# Patient Record
Sex: MALE | Race: WHITE | NOT HISPANIC OR LATINO | URBAN - METROPOLITAN AREA
[De-identification: names, ages, dates, MRNs, and addresses within clinical notes are randomized per-mention and may not be internally consistent; named-entity substitution may affect disease eponyms.]

---

## 2018-08-24 ENCOUNTER — TRANSCRIBE ORDERS (OUTPATIENT)
Dept: LAB | Age: 33
End: 2018-08-24

## 2018-08-24 ENCOUNTER — APPOINTMENT (OUTPATIENT)
Dept: LAB | Age: 33
End: 2018-08-24
Attending: INTERNAL MEDICINE
Payer: COMMERCIAL

## 2018-08-24 DIAGNOSIS — E10.9 TYPE 1 DIABETES MELLITUS WITHOUT COMPLICATIONS (CMS/HCC): ICD-10-CM

## 2018-08-24 DIAGNOSIS — E10.9 TYPE 1 DIABETES MELLITUS WITHOUT COMPLICATIONS (CMS/HCC): Primary | ICD-10-CM

## 2018-08-24 LAB
ALBUMIN SERPL-MCNC: 4 G/DL (ref 3.4–5)
ALBUMIN/CREAT UR: 3.6 UG/MG
ALP SERPL-CCNC: 81 IU/L (ref 35–126)
ALT SERPL-CCNC: 17 IU/L (ref 16–63)
ANION GAP SERPL CALC-SCNC: 11 MEQ/L (ref 3–15)
AST SERPL-CCNC: 14 IU/L (ref 15–41)
BILIRUB SERPL-MCNC: 1 MG/DL (ref 0.3–1.2)
BUN SERPL-MCNC: 17 MG/DL (ref 8–20)
CALCIUM SERPL-MCNC: 9.2 MG/DL (ref 8.9–10.3)
CHLORIDE SERPL-SCNC: 102 MMOL/L (ref 98–109)
CHOLEST SERPL-MCNC: 196 MG/DL
CO2 SERPL-SCNC: 23 MMOL/L (ref 22–32)
CREAT SERPL-MCNC: 0.9 MG/DL (ref 0.8–1.3)
CREAT UR-MCNC: 87.3 MG/DL
ERYTHROCYTE [DISTWIDTH] IN BLOOD BY AUTOMATED COUNT: 12.2 % (ref 11.6–14.4)
EST. AVERAGE GLUCOSE BLD GHB EST-MCNC: 200 MG/DL
GFR SERPL CREATININE-BSD FRML MDRD: >60 ML/MIN/1.73M*2
GLUCOSE SERPL-MCNC: 318 MG/DL (ref 70–99)
HBA1C MFR BLD HPLC: 8.6 %
HCT VFR BLDCO AUTO: 44.1 % (ref 40.1–51)
HDLC SERPL-MCNC: 78 MG/DL
HDLC SERPL: 2.5 {RATIO}
HGB BLD-MCNC: 14.7 G/DL (ref 13.7–17.5)
LDLC SERPL CALC-MCNC: 110 MG/DL
MCH RBC QN AUTO: 28.9 PG (ref 28–33.2)
MCHC RBC AUTO-ENTMCNC: 33.3 G/DL (ref 32.2–36.5)
MCV RBC AUTO: 86.8 FL (ref 83–98)
MICROALBUMIN UR-MCNC: 3.1 MG/L
NONHDLC SERPL-MCNC: 118 MG/DL
PDW BLD AUTO: 9.3 FL (ref 9.4–12.4)
PLATELET # BLD AUTO: 347 K/UL (ref 150–350)
POTASSIUM SERPL-SCNC: 4.2 MMOL/L (ref 3.6–5.1)
PROT SERPL-MCNC: 6.3 G/DL (ref 6–8.2)
RBC # BLD AUTO: 5.08 M/UL (ref 4.5–5.8)
SODIUM SERPL-SCNC: 136 MMOL/L (ref 136–144)
TRIGL SERPL-MCNC: 41 MG/DL (ref 30–149)
TSH SERPL DL<=0.05 MIU/L-ACNC: 0.86 MIU/ML (ref 0.34–5.6)
WBC # BLD AUTO: 7.36 K/UL (ref 3.8–10.5)

## 2018-08-24 PROCEDURE — 82043 UR ALBUMIN QUANTITATIVE: CPT

## 2018-08-24 PROCEDURE — 80053 COMPREHEN METABOLIC PANEL: CPT

## 2018-08-24 PROCEDURE — 36415 COLL VENOUS BLD VENIPUNCTURE: CPT

## 2018-08-24 PROCEDURE — 83036 HEMOGLOBIN GLYCOSYLATED A1C: CPT

## 2018-08-24 PROCEDURE — 84443 ASSAY THYROID STIM HORMONE: CPT

## 2018-08-24 PROCEDURE — 85027 COMPLETE CBC AUTOMATED: CPT

## 2018-08-24 PROCEDURE — 80061 LIPID PANEL: CPT

## 2018-11-30 ENCOUNTER — APPOINTMENT (OUTPATIENT)
Dept: LAB | Age: 33
End: 2018-11-30
Attending: INTERNAL MEDICINE
Payer: COMMERCIAL

## 2018-11-30 ENCOUNTER — TRANSCRIBE ORDERS (OUTPATIENT)
Dept: LAB | Age: 33
End: 2018-11-30

## 2018-11-30 DIAGNOSIS — E11.649 TYPE 2 DIABETES MELLITUS WITH HYPOGLYCEMIA WITHOUT COMA (CMS/HCC): ICD-10-CM

## 2018-11-30 DIAGNOSIS — E10.9 TYPE 1 DIABETES MELLITUS WITHOUT COMPLICATIONS (CMS/HCC): Primary | ICD-10-CM

## 2018-11-30 DIAGNOSIS — E10.9 TYPE 1 DIABETES MELLITUS WITHOUT COMPLICATIONS (CMS/HCC): ICD-10-CM

## 2018-11-30 LAB
ANION GAP SERPL CALC-SCNC: 11 MEQ/L (ref 3–15)
BUN SERPL-MCNC: 14 MG/DL (ref 8–20)
CALCIUM SERPL-MCNC: 9 MG/DL (ref 8.9–10.3)
CHLORIDE SERPL-SCNC: 100 MEQ/L (ref 98–109)
CHOLEST SERPL-MCNC: 189 MG/DL
CO2 SERPL-SCNC: 24 MEQ/L (ref 22–32)
CREAT SERPL-MCNC: 1 MG/DL
GFR SERPL CREATININE-BSD FRML MDRD: >60 ML/MIN/1.73M*2
GLUCOSE SERPL-MCNC: 246 MG/DL (ref 70–99)
HDLC SERPL-MCNC: 66 MG/DL
HDLC SERPL: 2.9 {RATIO}
LDLC SERPL CALC-MCNC: 112 MG/DL
NONHDLC SERPL-MCNC: 123 MG/DL
POTASSIUM SERPL-SCNC: 4.4 MEQ/L (ref 3.6–5.1)
SODIUM SERPL-SCNC: 135 MEQ/L (ref 136–144)
TRIGL SERPL-MCNC: 53 MG/DL (ref 30–149)

## 2018-11-30 PROCEDURE — 36415 COLL VENOUS BLD VENIPUNCTURE: CPT

## 2018-11-30 PROCEDURE — 80061 LIPID PANEL: CPT

## 2018-11-30 PROCEDURE — 83036 HEMOGLOBIN GLYCOSYLATED A1C: CPT

## 2018-11-30 PROCEDURE — 80048 BASIC METABOLIC PNL TOTAL CA: CPT

## 2018-12-01 LAB
EST. AVERAGE GLUCOSE BLD GHB EST-MCNC: 203 MG/DL
HBA1C MFR BLD HPLC: 8.7 %

## 2019-03-02 ENCOUNTER — TRANSCRIBE ORDERS (OUTPATIENT)
Dept: LAB | Age: 34
End: 2019-03-02

## 2019-03-02 ENCOUNTER — APPOINTMENT (OUTPATIENT)
Dept: LAB | Age: 34
End: 2019-03-02
Attending: INTERNAL MEDICINE
Payer: COMMERCIAL

## 2019-03-02 DIAGNOSIS — E10.9 TYPE 1 DIABETES MELLITUS WITHOUT COMPLICATIONS (CMS/HCC): Primary | ICD-10-CM

## 2019-03-02 DIAGNOSIS — E78.5 HYPERLIPIDEMIA: ICD-10-CM

## 2019-03-02 DIAGNOSIS — E10.9 TYPE 1 DIABETES MELLITUS WITHOUT COMPLICATIONS (CMS/HCC): ICD-10-CM

## 2019-03-02 DIAGNOSIS — E10.649 TYPE 1 DIABETES MELLITUS WITH HYPOGLYCEMIA AND WITHOUT COMA (CMS/HCC): ICD-10-CM

## 2019-03-02 LAB
ANION GAP SERPL CALC-SCNC: 8 MEQ/L (ref 3–15)
BUN SERPL-MCNC: 14 MG/DL (ref 8–20)
CALCIUM SERPL-MCNC: 9.2 MG/DL (ref 8.9–10.3)
CHLORIDE SERPL-SCNC: 105 MEQ/L (ref 98–109)
CHOLEST SERPL-MCNC: 134 MG/DL
CO2 SERPL-SCNC: 25 MEQ/L (ref 22–32)
CREAT SERPL-MCNC: 0.8 MG/DL
EST. AVERAGE GLUCOSE BLD GHB EST-MCNC: 200 MG/DL
GFR SERPL CREATININE-BSD FRML MDRD: >60 ML/MIN/1.73M*2
GLUCOSE SERPL-MCNC: 87 MG/DL (ref 70–99)
HBA1C MFR BLD HPLC: 8.6 %
HDLC SERPL-MCNC: 58 MG/DL
HDLC SERPL: 2.3 {RATIO}
LDLC SERPL CALC-MCNC: 67 MG/DL
NONHDLC SERPL-MCNC: 76 MG/DL
POTASSIUM SERPL-SCNC: 4.1 MEQ/L (ref 3.6–5.1)
SODIUM SERPL-SCNC: 138 MEQ/L (ref 136–144)
T4 FREE SERPL-MCNC: 1.04 NG/DL (ref 0.58–1.64)
TRIGL SERPL-MCNC: 45 MG/DL (ref 30–149)
TSH SERPL DL<=0.05 MIU/L-ACNC: 0.99 MIU/L (ref 0.34–5.6)

## 2019-03-02 PROCEDURE — 36415 COLL VENOUS BLD VENIPUNCTURE: CPT

## 2019-03-02 PROCEDURE — 84443 ASSAY THYROID STIM HORMONE: CPT

## 2019-03-02 PROCEDURE — 83036 HEMOGLOBIN GLYCOSYLATED A1C: CPT

## 2019-03-02 PROCEDURE — 80061 LIPID PANEL: CPT

## 2019-03-02 PROCEDURE — 84439 ASSAY OF FREE THYROXINE: CPT

## 2019-03-02 PROCEDURE — 82565 ASSAY OF CREATININE: CPT

## 2019-06-08 ENCOUNTER — TRANSCRIBE ORDERS (OUTPATIENT)
Dept: LAB | Age: 34
End: 2019-06-08

## 2019-06-08 ENCOUNTER — APPOINTMENT (OUTPATIENT)
Dept: LAB | Age: 34
End: 2019-06-08
Attending: INTERNAL MEDICINE
Payer: COMMERCIAL

## 2019-06-08 DIAGNOSIS — E10.9 TYPE 1 DIABETES MELLITUS WITHOUT COMPLICATIONS (CMS/HCC): Primary | ICD-10-CM

## 2019-06-08 DIAGNOSIS — E10.9 TYPE 1 DIABETES MELLITUS WITHOUT COMPLICATIONS (CMS/HCC): ICD-10-CM

## 2019-06-08 LAB
ALBUMIN/CREAT UR: 2.5 UG/MG
ANION GAP SERPL CALC-SCNC: 8 MEQ/L (ref 3–15)
BUN SERPL-MCNC: 13 MG/DL (ref 8–20)
CALCIUM SERPL-MCNC: 9.1 MG/DL (ref 8.9–10.3)
CHLORIDE SERPL-SCNC: 104 MEQ/L (ref 98–109)
CO2 SERPL-SCNC: 25 MEQ/L (ref 22–32)
CREAT SERPL-MCNC: 0.8 MG/DL
CREAT UR-MCNC: 79.7 MG/DL
EST. AVERAGE GLUCOSE BLD GHB EST-MCNC: 200 MG/DL
GFR SERPL CREATININE-BSD FRML MDRD: >60 ML/MIN/1.73M*2
GLUCOSE SERPL-MCNC: 240 MG/DL (ref 70–99)
HBA1C MFR BLD HPLC: 8.6 %
MICROALBUMIN UR-MCNC: 2 MG/L
POTASSIUM SERPL-SCNC: 4.2 MEQ/L (ref 3.6–5.1)
SODIUM SERPL-SCNC: 137 MEQ/L (ref 136–144)

## 2019-06-08 PROCEDURE — 83036 HEMOGLOBIN GLYCOSYLATED A1C: CPT

## 2019-06-08 PROCEDURE — 36415 COLL VENOUS BLD VENIPUNCTURE: CPT

## 2019-06-08 PROCEDURE — 82043 UR ALBUMIN QUANTITATIVE: CPT

## 2019-06-08 PROCEDURE — 80048 BASIC METABOLIC PNL TOTAL CA: CPT

## 2019-06-25 ENCOUNTER — TRANSCRIBE ORDERS (OUTPATIENT)
Dept: LAB | Age: 34
End: 2019-06-25

## 2019-06-25 ENCOUNTER — APPOINTMENT (OUTPATIENT)
Dept: LAB | Age: 34
End: 2019-06-25
Attending: OBSTETRICS & GYNECOLOGY
Payer: COMMERCIAL

## 2019-06-25 DIAGNOSIS — Z13.228 ENCOUNTER FOR SCREENING FOR OTHER METABOLIC DISORDERS: Primary | ICD-10-CM

## 2019-06-25 DIAGNOSIS — Z13.228 ENCOUNTER FOR SCREENING FOR OTHER METABOLIC DISORDERS: ICD-10-CM

## 2019-06-25 PROCEDURE — 81220 CFTR GENE COM VARIANTS: CPT

## 2019-06-25 PROCEDURE — 36415 COLL VENOUS BLD VENIPUNCTURE: CPT

## 2019-07-04 LAB
CFTR MUT ANL BLD/T: NORMAL
CFTR MUT ANL BLD/T: NORMAL
CFTR MUT TESTED BLD/T: NORMAL
GENETICIST REVIEW: NORMAL
REF LAB TEST METHOD: NORMAL

## 2019-07-31 ENCOUNTER — OFFICE VISIT (OUTPATIENT)
Dept: FAMILY MEDICINE | Facility: CLINIC | Age: 34
End: 2019-07-31
Payer: COMMERCIAL

## 2019-07-31 VITALS
RESPIRATION RATE: 17 BRPM | TEMPERATURE: 98.2 F | HEART RATE: 81 BPM | DIASTOLIC BLOOD PRESSURE: 80 MMHG | WEIGHT: 186.8 LBS | OXYGEN SATURATION: 97 % | HEIGHT: 66 IN | BODY MASS INDEX: 30.02 KG/M2 | SYSTOLIC BLOOD PRESSURE: 112 MMHG

## 2019-07-31 DIAGNOSIS — Z00.00 ROUTINE MEDICAL EXAM: Primary | ICD-10-CM

## 2019-07-31 LAB
BILIRUBIN, POC: NEGATIVE
BLOOD URINE, POC: NEGATIVE
CLARITY, POC: CLEAR
COLOR, POC: YELLOW
GLUCOSE URINE, POC: NORMAL
KETONES, POC: NEGATIVE
LEUKOCYTE EST, POC: NEGATIVE
NITRITE, POC: NEGATIVE
PH, POC: 6
PROTEIN, POC: NEGATIVE
SPECIFIC GRAVITY, POC: 1.01
UROBILINOGEN, POC: 0.2

## 2019-07-31 PROCEDURE — 99385 PREV VISIT NEW AGE 18-39: CPT | Performed by: NURSE PRACTITIONER

## 2019-07-31 PROCEDURE — 81002 URINALYSIS NONAUTO W/O SCOPE: CPT | Performed by: NURSE PRACTITIONER

## 2019-07-31 RX ORDER — ATORVASTATIN CALCIUM 20 MG/1
20 TABLET, FILM COATED ORAL DAILY
COMMUNITY

## 2019-07-31 RX ORDER — INSULIN DEGLUDEC 100 U/ML
INJECTION, SOLUTION SUBCUTANEOUS
COMMUNITY

## 2019-07-31 ASSESSMENT — ENCOUNTER SYMPTOMS
PALPITATIONS: 0
CHEST TIGHTNESS: 0
RHINORRHEA: 0
SHORTNESS OF BREATH: 0
PSYCHIATRIC NEGATIVE: 1
SORE THROAT: 0
EYES NEGATIVE: 1
COUGH: 0
CARDIOVASCULAR NEGATIVE: 1
POLYPHAGIA: 0
CONFUSION: 0
MUSCULOSKELETAL NEGATIVE: 1
ENDOCRINE NEGATIVE: 1
FATIGUE: 0
FEVER: 0
FLANK PAIN: 0
DIZZINESS: 0
ARTHRALGIAS: 0
APPETITE CHANGE: 0
POLYDIPSIA: 0
NECK PAIN: 0
HEMATOLOGIC/LYMPHATIC NEGATIVE: 1
FREQUENCY: 0
DYSURIA: 0
GASTROINTESTINAL NEGATIVE: 1
DIARRHEA: 0
NEUROLOGICAL NEGATIVE: 1
RESPIRATORY NEGATIVE: 1
EYE DISCHARGE: 0
WHEEZING: 0
SEIZURES: 0
EYE REDNESS: 0
ALLERGIC/IMMUNOLOGIC NEGATIVE: 1
CONSTITUTIONAL NEGATIVE: 1
MYALGIAS: 0
ABDOMINAL PAIN: 0
NAUSEA: 0
HEADACHES: 0
CONSTIPATION: 0
ADENOPATHY: 0

## 2019-07-31 ASSESSMENT — VISUAL ACUITY
OS_CC: 20/40
OD_CC: 20/30

## 2019-07-31 NOTE — PROGRESS NOTES
[facility]     Reason for visit:   Chief Complaint   Patient presents with   • Annual Exam      HPI  Kervin Mitchell is a 34 y.o. male presents as a new patient for annual physical.    Health Maintenance    Most Recent Labs: 6/8/2019  Flu Vaccine: declined last year   Last Tetanus/Tdap: will get in the Fall when he gets flu vaccine   Last eye exam: 2 yr ago  Last dentist appt: due   Diet: follows diabetic diet   Exercise: goes to gym - and has 3 yr old at home  Sexual Activity: yes   Partners: no     Diabetic for 19 yrs taking insulin - follows with [facility]         Review of Systems   Constitutional: Negative.  Negative for appetite change, fatigue and fever.   HENT: Negative.  Negative for congestion, ear pain, rhinorrhea and sore throat.    Eyes: Negative.  Negative for discharge, redness and visual disturbance.   Respiratory: Negative.  Negative for cough, chest tightness, shortness of breath and wheezing.    Cardiovascular: Negative.  Negative for chest pain, palpitations and leg swelling.   Gastrointestinal: Negative.  Negative for abdominal pain, constipation, diarrhea and nausea.   Endocrine: Negative.  Negative for polydipsia, polyphagia and polyuria.   Genitourinary: Negative.  Negative for dysuria, flank pain, frequency and urgency.   Musculoskeletal: Negative.  Negative for arthralgias, myalgias and neck pain.   Skin: Negative.  Negative for pallor and rash.   Allergic/Immunologic: Negative.  Negative for immunocompromised state.   Neurological: Negative.  Negative for dizziness, seizures and headaches.   Hematological: Negative.  Negative for adenopathy.   Psychiatric/Behavioral: Negative.  Negative for behavioral problems and confusion.        There is no problem list on file for this patient.        Past Medical History:   Diagnosis Date   • Diabetes mellitus type I (CMS/HCC) (HCC)    • Hyperlipidemia      History reviewed. No pertinent surgical  "history.  Social History     Social History   • Marital status: Single     Spouse name: N/A   • Number of children: N/A   • Years of education: N/A     Occupational History   • Not on file.     Social History Main Topics   • Smoking status: Former Smoker   • Smokeless tobacco: Never Used   • Alcohol use 0.6 oz/week     1 Cans of beer per week   • Drug use: No   • Sexual activity: Not on file     Other Topics Concern   • Not on file     Social History Narrative   • No narrative on file     Family History   Problem Relation Age of Onset   • No Known Problems Mother    • Parkinsonism Father    • No Known Problems Sister        Allergies:  Naproxen    Current Outpatient Prescriptions   Medication Sig Dispense Refill   • atorvastatin (LIPITOR) 20 mg tablet Take 20 mg by mouth daily.     • insulin degludec (TRESIBA FLEXTOUCH U-100) 100 unit/mL (3 mL) insulin pen pen Inject under the skin.     • insulin lispro (HumaLOG U-100 Insulin) 100 unit/mL cartridge Inject under the skin.       No current facility-administered medications for this visit.         Objective   Vitals:    07/31/19 1415   BP: 112/80   BP Location: Left upper arm   Patient Position: Sitting   Pulse: 81   Resp: 17   Temp: 36.8 °C (98.2 °F)   SpO2: 97%   Weight: 84.7 kg (186 lb 12.8 oz)   Height: 1.664 m (5' 5.5\")     Ht Readings from Last 3 Encounters:   07/31/19 1.664 m (5' 5.5\")     Wt Readings from Last 3 Encounters:   07/31/19 84.7 kg (186 lb 12.8 oz)     Body mass index is 30.61 kg/m².     Visual Acuity Screening    Right eye Left eye Both eyes   Without correction:      With correction: 20/30 20/40 20/30   Comments: Not color blind      Physical Exam   Constitutional: He is oriented to person, place, and time. Vital signs are normal. He appears well-developed and well-nourished. He is cooperative.   HENT:   Head: Normocephalic.   Right Ear: Hearing, tympanic membrane, external ear and ear canal normal.   Left Ear: Hearing, tympanic membrane, external " ear and ear canal normal.   Eyes: Pupils are equal, round, and reactive to light. Conjunctivae, EOM and lids are normal.   Neck: Trachea normal and normal range of motion. Carotid bruit is not present. No thyroid mass and no thyromegaly present.   Cardiovascular: Normal rate, regular rhythm, S1 normal, S2 normal and normal heart sounds.    Pulses:       Radial pulses are 2+ on the right side, and 2+ on the left side.        Dorsalis pedis pulses are 2+ on the right side, and 2+ on the left side.   Pulmonary/Chest: Effort normal and breath sounds normal.   Abdominal: Soft. Normal appearance and bowel sounds are normal. There is no tenderness.   Musculoskeletal: Normal range of motion.   Lymphadenopathy:     He has no cervical adenopathy.   Neurological: He is alert and oriented to person, place, and time. He has normal strength. No cranial nerve deficit or sensory deficit. GCS eye subscore is 4. GCS verbal subscore is 5. GCS motor subscore is 6.   Skin: Skin is warm, dry and intact.   Psychiatric: He has a normal mood and affect. His speech is normal and behavior is normal.        Lab Results   Component Value Date    WBC 7.36 08/24/2018    HGB 14.7 08/24/2018    HCT 44.1 08/24/2018     08/24/2018     06/08/2019    K 4.2 06/08/2019     06/08/2019    BUN 13 06/08/2019    CREATININE 0.8 06/08/2019    CO2 25 06/08/2019    ALT 17 08/24/2018    AST 14 (L) 08/24/2018    CHOL 134 03/02/2019    TRIG 45 03/02/2019    HDL 58 03/02/2019    LDLCALC 67 03/02/2019    TSH 0.99 03/02/2019    HGBA1C 8.6 (H) 06/08/2019    MICROALBUR 2.0 06/08/2019       Point of Care Testing Results  Results for orders placed or performed in visit on 07/31/19   POCT urinalysis dipstick   Result Value Ref Range    Color, UA Yellow     Clarity, UA Clear     Glucose, UA 2+     Bilirubin, UA Negative     Ketones, UA Negative     Spec Grav, UA 1.015     Blood, UA Negative     pH, UA 6.0     Protein, UA Negative     Urobilinogen, UA 0.2      Leukocytes, UA Negative     Nitrite, UA Negative         Assessment   Diagnoses and all orders for this visit:    Routine medical exam (Primary)  -     POCT urinalysis dipstick    Pt encouraged to make appt to see eye doctor and dentist.  Pt will continue to follow up with Endocrinology. Encouraged to return in the Fall for Tdap and Flu vaccine.  The patient has been in otherwise good general health in the past.        Patient has been educated on the risks, benefits, and side effects of all medication prescribed at this visit.  Patient expressed understanding and agreement with plan.  Return in about 1 year (around 7/31/2020).  MANUEL Gross  7/31/2019       There are no Patient Instructions on file for this visit.

## 2019-09-29 ENCOUNTER — TRANSCRIBE ORDERS (OUTPATIENT)
Dept: LAB | Age: 34
End: 2019-09-29

## 2019-09-29 ENCOUNTER — APPOINTMENT (OUTPATIENT)
Dept: LAB | Age: 34
End: 2019-09-29
Attending: INTERNAL MEDICINE
Payer: COMMERCIAL

## 2019-09-29 DIAGNOSIS — E10.9 TYPE 1 DIABETES MELLITUS WITHOUT COMPLICATIONS (CMS/HCC): Primary | ICD-10-CM

## 2019-09-29 DIAGNOSIS — E78.5 HYPERLIPIDEMIA: ICD-10-CM

## 2019-09-29 DIAGNOSIS — E10.9 TYPE 1 DIABETES MELLITUS WITHOUT COMPLICATIONS (CMS/HCC): ICD-10-CM

## 2019-09-29 LAB
ANION GAP SERPL CALC-SCNC: 8 MEQ/L (ref 3–15)
BUN SERPL-MCNC: 16 MG/DL (ref 8–20)
CALCIUM SERPL-MCNC: 9.1 MG/DL (ref 8.9–10.3)
CHLORIDE SERPL-SCNC: 105 MEQ/L (ref 98–109)
CHOLEST SERPL-MCNC: 136 MG/DL
CO2 SERPL-SCNC: 26 MEQ/L (ref 22–32)
CREAT SERPL-MCNC: 0.9 MG/DL
GFR SERPL CREATININE-BSD FRML MDRD: >60 ML/MIN/1.73M*2
GLUCOSE SERPL-MCNC: 135 MG/DL (ref 70–99)
HDLC SERPL-MCNC: 53 MG/DL
HDLC SERPL: 2.6 {RATIO}
LDLC SERPL CALC-MCNC: 74 MG/DL
NONHDLC SERPL-MCNC: 83 MG/DL
POTASSIUM SERPL-SCNC: 4.3 MEQ/L (ref 3.6–5.1)
SODIUM SERPL-SCNC: 139 MEQ/L (ref 136–144)
T4 FREE SERPL-MCNC: 1 NG/DL (ref 0.58–1.64)
TRIGL SERPL-MCNC: 47 MG/DL (ref 30–149)
TSH SERPL DL<=0.05 MIU/L-ACNC: 1.38 MIU/L (ref 0.34–5.6)

## 2019-09-29 PROCEDURE — 80061 LIPID PANEL: CPT

## 2019-09-29 PROCEDURE — 36415 COLL VENOUS BLD VENIPUNCTURE: CPT

## 2019-09-29 PROCEDURE — 84439 ASSAY OF FREE THYROXINE: CPT

## 2019-09-29 PROCEDURE — 80048 BASIC METABOLIC PNL TOTAL CA: CPT

## 2019-09-29 PROCEDURE — 83036 HEMOGLOBIN GLYCOSYLATED A1C: CPT

## 2019-09-29 PROCEDURE — 84443 ASSAY THYROID STIM HORMONE: CPT

## 2019-09-30 LAB
EST. AVERAGE GLUCOSE BLD GHB EST-MCNC: 203 MG/DL
HBA1C MFR BLD HPLC: 8.7 %

## 2019-12-07 ENCOUNTER — TRANSCRIBE ORDERS (OUTPATIENT)
Dept: LAB | Age: 34
End: 2019-12-07

## 2019-12-07 ENCOUNTER — APPOINTMENT (OUTPATIENT)
Dept: LAB | Age: 34
End: 2019-12-07
Attending: INTERNAL MEDICINE
Payer: COMMERCIAL

## 2019-12-07 DIAGNOSIS — E10.9 TYPE 1 DIABETES MELLITUS WITHOUT COMPLICATIONS (CMS/HCC): ICD-10-CM

## 2019-12-07 DIAGNOSIS — E10.9 TYPE 1 DIABETES MELLITUS WITHOUT COMPLICATIONS (CMS/HCC): Primary | ICD-10-CM

## 2019-12-07 LAB
ANION GAP SERPL CALC-SCNC: 8 MEQ/L (ref 3–15)
BUN SERPL-MCNC: 16 MG/DL (ref 8–20)
CALCIUM SERPL-MCNC: 8.9 MG/DL (ref 8.9–10.3)
CHLORIDE SERPL-SCNC: 104 MEQ/L (ref 98–109)
CO2 SERPL-SCNC: 26 MEQ/L (ref 22–32)
CREAT SERPL-MCNC: 0.9 MG/DL
EST. AVERAGE GLUCOSE BLD GHB EST-MCNC: 203 MG/DL
GFR SERPL CREATININE-BSD FRML MDRD: >60 ML/MIN/1.73M*2
GLUCOSE SERPL-MCNC: 145 MG/DL (ref 70–99)
HBA1C MFR BLD HPLC: 8.7 %
HBA1C MFR BLD: 8.7 %
POTASSIUM SERPL-SCNC: 4.2 MEQ/L (ref 3.6–5.1)
SODIUM SERPL-SCNC: 138 MEQ/L (ref 136–144)

## 2019-12-07 PROCEDURE — 80048 BASIC METABOLIC PNL TOTAL CA: CPT

## 2019-12-07 PROCEDURE — 83036 HEMOGLOBIN GLYCOSYLATED A1C: CPT

## 2019-12-07 PROCEDURE — 36415 COLL VENOUS BLD VENIPUNCTURE: CPT

## 2021-04-24 ENCOUNTER — APPOINTMENT (OUTPATIENT)
Dept: LAB | Age: 36
End: 2021-04-24
Attending: INTERNAL MEDICINE
Payer: COMMERCIAL

## 2021-04-24 ENCOUNTER — TRANSCRIBE ORDERS (OUTPATIENT)
Dept: SCHEDULING | Age: 36
End: 2021-04-24

## 2021-04-24 DIAGNOSIS — E10.9 TYPE 1 DIABETES MELLITUS WITHOUT COMPLICATIONS (CMS/HCC): ICD-10-CM

## 2021-04-24 DIAGNOSIS — E78.5 HYPERLIPIDEMIA, UNSPECIFIED: ICD-10-CM

## 2021-04-24 DIAGNOSIS — E10.9 TYPE 1 DIABETES MELLITUS WITHOUT COMPLICATIONS (CMS/HCC): Primary | ICD-10-CM

## 2021-04-24 LAB
ALBUMIN SERPL-MCNC: 3.8 G/DL (ref 3.4–5)
ALBUMIN/CREAT UR: 1.7 UG/MG
ALP SERPL-CCNC: 83 IU/L (ref 35–126)
ALT SERPL-CCNC: 16 IU/L (ref 16–63)
ANION GAP SERPL CALC-SCNC: 11 MEQ/L (ref 3–15)
AST SERPL-CCNC: 14 IU/L (ref 15–41)
BILIRUB SERPL-MCNC: 0.8 MG/DL (ref 0.3–1.2)
BUN SERPL-MCNC: 17 MG/DL (ref 8–20)
CALCIUM SERPL-MCNC: 9.1 MG/DL (ref 8.9–10.3)
CHLORIDE SERPL-SCNC: 99 MEQ/L (ref 98–109)
CHOLEST SERPL-MCNC: 172 MG/DL
CO2 SERPL-SCNC: 27 MEQ/L (ref 22–32)
CREAT SERPL-MCNC: 0.9 MG/DL (ref 0.8–1.3)
CREAT UR-MCNC: 240.1 MG/DL
ERYTHROCYTE [DISTWIDTH] IN BLOOD BY AUTOMATED COUNT: 12.8 % (ref 11.6–14.4)
EST. AVERAGE GLUCOSE BLD GHB EST-MCNC: 189 MG/DL
GFR SERPL CREATININE-BSD FRML MDRD: >60 ML/MIN/1.73M*2
GLUCOSE SERPL-MCNC: 151 MG/DL (ref 70–99)
HBA1C MFR BLD HPLC: 8.2 %
HBA1C MFR BLD: 8.2 %
HCT VFR BLDCO AUTO: 47 % (ref 40.1–51)
HDLC SERPL-MCNC: 54 MG/DL
HDLC SERPL: 3.2 {RATIO}
HGB BLD-MCNC: 15.4 G/DL (ref 13.7–17.5)
LDLC SERPL CALC-MCNC: 105 MG/DL
MCH RBC QN AUTO: 29.3 PG (ref 28–33.2)
MCHC RBC AUTO-ENTMCNC: 32.8 G/DL (ref 32.2–36.5)
MCV RBC AUTO: 89.5 FL (ref 83–98)
MICROALBUMIN UR-MCNC: 4 MG/L
NONHDLC SERPL-MCNC: 118 MG/DL
PDW BLD AUTO: 9.2 FL (ref 9.4–12.4)
PLATELET # BLD AUTO: 367 K/UL (ref 150–350)
POTASSIUM SERPL-SCNC: 4.3 MEQ/L (ref 3.6–5.1)
PROT SERPL-MCNC: 6.5 G/DL (ref 6–8.2)
RBC # BLD AUTO: 5.25 M/UL (ref 4.5–5.8)
SODIUM SERPL-SCNC: 137 MEQ/L (ref 136–144)
TRIGL SERPL-MCNC: 63 MG/DL (ref 30–149)
TSH SERPL DL<=0.05 MIU/L-ACNC: 1.85 MIU/L (ref 0.34–5.6)
WBC # BLD AUTO: 7.01 K/UL (ref 3.8–10.5)

## 2021-04-24 PROCEDURE — 85027 COMPLETE CBC AUTOMATED: CPT

## 2021-04-24 PROCEDURE — 80053 COMPREHEN METABOLIC PANEL: CPT

## 2021-04-24 PROCEDURE — 36415 COLL VENOUS BLD VENIPUNCTURE: CPT

## 2021-04-24 PROCEDURE — 80061 LIPID PANEL: CPT

## 2021-04-24 PROCEDURE — 84443 ASSAY THYROID STIM HORMONE: CPT

## 2021-04-24 PROCEDURE — 83036 HEMOGLOBIN GLYCOSYLATED A1C: CPT

## 2021-04-24 PROCEDURE — 82043 UR ALBUMIN QUANTITATIVE: CPT

## 2021-11-20 ENCOUNTER — HOSPITAL ENCOUNTER (OUTPATIENT)
Facility: CLINIC | Age: 36
Discharge: HOME | End: 2021-11-20
Attending: FAMILY MEDICINE
Payer: COMMERCIAL

## 2021-11-20 VITALS
TEMPERATURE: 98.7 F | HEART RATE: 94 BPM | OXYGEN SATURATION: 99 % | DIASTOLIC BLOOD PRESSURE: 88 MMHG | SYSTOLIC BLOOD PRESSURE: 112 MMHG

## 2021-11-20 DIAGNOSIS — K04.7 TOOTH INFECTION: Primary | ICD-10-CM

## 2021-11-20 PROCEDURE — 99213 OFFICE O/P EST LOW 20 MIN: CPT | Performed by: FAMILY MEDICINE

## 2021-11-20 PROCEDURE — S9083 URGENT CARE CENTER GLOBAL: HCPCS | Performed by: FAMILY MEDICINE

## 2021-11-20 RX ORDER — AMOXICILLIN AND CLAVULANATE POTASSIUM 875; 125 MG/1; MG/1
1 TABLET, FILM COATED ORAL 2 TIMES DAILY
Qty: 20 TABLET | Refills: 0 | Status: SHIPPED | OUTPATIENT
Start: 2021-11-20 | End: 2021-11-30

## 2021-11-20 ASSESSMENT — ENCOUNTER SYMPTOMS
GASTROINTESTINAL NEGATIVE: 1
MUSCULOSKELETAL NEGATIVE: 1
CARDIOVASCULAR NEGATIVE: 1
CONSTITUTIONAL NEGATIVE: 1
RESPIRATORY NEGATIVE: 1
PSYCHIATRIC NEGATIVE: 1
SINUS PRESSURE: 0
SINUS PAIN: 0
EYES NEGATIVE: 1
NEUROLOGICAL NEGATIVE: 1
SORE THROAT: 0

## 2021-11-20 NOTE — DISCHARGE INSTRUCTIONS
Take tylenol as needed  Take the antibiotic with food ,eat yogurt and take probiotics  Follow up with dentist on Monday ,call for an appt to be seen

## 2021-11-21 NOTE — ED PROVIDER NOTES
History  Chief Complaint   Patient presents with   • Jaw Pain     Upper L side mouth poain due to possible infestion to Upper L gum.  L side facial swelling noted. Deneis any temperature sensitivity. Symptoms x yesterday.      He is here with the c/o swelling left side of the cheek /jaw ,pain and swelling left upper gum /teeth from yesterday  He had teeth infection in the past  No fever          Past Medical History:   Diagnosis Date   • Diabetes mellitus type I (CMS/HCC)    • Hyperlipidemia        No past surgical history on file.    Family History   Problem Relation Age of Onset   • No Known Problems Biological Mother    • Parkinsonism Biological Father    • No Known Problems Biological Sister        Social History     Tobacco Use   • Smoking status: Former Smoker   • Smokeless tobacco: Never Used   Substance Use Topics   • Alcohol use: Yes     Alcohol/week: 1.0 standard drink     Types: 1 Cans of beer per week   • Drug use: No       Review of Systems   Constitutional: Negative.    HENT: Positive for dental problem. Negative for congestion, sinus pressure, sinus pain and sore throat.    Eyes: Negative.    Respiratory: Negative.    Cardiovascular: Negative.    Gastrointestinal: Negative.    Genitourinary: Negative.    Musculoskeletal: Negative.    Skin: Negative for rash.   Neurological: Negative.    Psychiatric/Behavioral: Negative.        Physical Exam  ED Triage Vitals [11/20/21 1156]   Temp Heart Rate Resp BP SpO2   37.1 °C (98.7 °F) 94 -- 112/88 99 %      Temp Source Heart Rate Source Patient Position BP Location FiO2 (%) (Set)   Oral -- Sitting Right upper arm --       Physical Exam  Constitutional:       General: He is not in acute distress.  HENT:      Mouth/Throat:      Dentition: Dental tenderness, gingival swelling and dental caries present.      Comments: Has tenderness and swelling left upper gum/tooth ,has some fillings for that tooth,no flutuance note now ,has some erythema gum,also has swelling left  side of cheek externally  Neurological:      Mental Status: He is alert.           Procedures  Procedures    UC Course  Clinical Impressions as of 11/20/21 2018   Tooth infection       MDM  Number of Diagnoses or Management Options  Diagnosis management comments: Given augmentin for tooth /gum infection ,advised to see his dentist on monday                 Lynn Alcantara MD  11/20/21 2018

## 2021-12-11 ENCOUNTER — HOSPITAL ENCOUNTER (EMERGENCY)
Facility: HOSPITAL | Age: 36
Discharge: HOME | End: 2021-12-11
Attending: EMERGENCY MEDICINE
Payer: COMMERCIAL

## 2021-12-11 ENCOUNTER — APPOINTMENT (EMERGENCY)
Dept: RADIOLOGY | Facility: HOSPITAL | Age: 36
End: 2021-12-11
Attending: EMERGENCY MEDICINE
Payer: COMMERCIAL

## 2021-12-11 VITALS
RESPIRATION RATE: 18 BRPM | WEIGHT: 186 LBS | SYSTOLIC BLOOD PRESSURE: 113 MMHG | HEIGHT: 66 IN | HEART RATE: 90 BPM | OXYGEN SATURATION: 98 % | DIASTOLIC BLOOD PRESSURE: 61 MMHG | TEMPERATURE: 98.1 F | BODY MASS INDEX: 29.89 KG/M2

## 2021-12-11 DIAGNOSIS — R11.2 NAUSEA AND VOMITING, INTRACTABILITY OF VOMITING NOT SPECIFIED, UNSPECIFIED VOMITING TYPE: ICD-10-CM

## 2021-12-11 DIAGNOSIS — R10.31 RIGHT LOWER QUADRANT ABDOMINAL PAIN: Primary | ICD-10-CM

## 2021-12-11 DIAGNOSIS — R19.7 DIARRHEA, UNSPECIFIED TYPE: ICD-10-CM

## 2021-12-11 LAB
ALBUMIN SERPL-MCNC: 4 G/DL (ref 3.4–5)
ALP SERPL-CCNC: 86 IU/L (ref 35–126)
ALT SERPL-CCNC: 12 IU/L (ref 16–63)
ANION GAP SERPL CALC-SCNC: 7 MEQ/L (ref 3–15)
AST SERPL-CCNC: 14 IU/L (ref 15–41)
BACTERIA URNS QL MICRO: ABNORMAL /HPF
BASOPHILS # BLD: 0.07 K/UL (ref 0.01–0.1)
BASOPHILS NFR BLD: 0.7 %
BILIRUB SERPL-MCNC: 0.9 MG/DL (ref 0.3–1.2)
BILIRUB UR QL STRIP.AUTO: NEGATIVE MG/DL
BUN SERPL-MCNC: 19 MG/DL (ref 8–20)
CALCIUM SERPL-MCNC: 8.5 MG/DL (ref 8.9–10.3)
CHLORIDE SERPL-SCNC: 100 MEQ/L (ref 98–109)
CLARITY UR REFRACT.AUTO: CLEAR
CO2 SERPL-SCNC: 28 MEQ/L (ref 22–32)
COLOR UR AUTO: YELLOW
CREAT SERPL-MCNC: 0.8 MG/DL (ref 0.8–1.3)
DIFFERENTIAL METHOD BLD: ABNORMAL
EOSINOPHIL # BLD: 0.69 K/UL (ref 0.04–0.54)
EOSINOPHIL NFR BLD: 7 %
ERYTHROCYTE [DISTWIDTH] IN BLOOD BY AUTOMATED COUNT: 12.3 % (ref 11.6–14.4)
GFR SERPL CREATININE-BSD FRML MDRD: >60 ML/MIN/1.73M*2
GLUCOSE SERPL-MCNC: 130 MG/DL (ref 70–99)
GLUCOSE UR STRIP.AUTO-MCNC: ABNORMAL MG/DL
HCT VFR BLDCO AUTO: 44.7 % (ref 40.1–51)
HGB BLD-MCNC: 15 G/DL (ref 13.7–17.5)
HGB UR QL STRIP.AUTO: NEGATIVE
HYALINE CASTS #/AREA URNS LPF: ABNORMAL /LPF
IMM GRANULOCYTES # BLD AUTO: 0.03 K/UL (ref 0–0.08)
IMM GRANULOCYTES NFR BLD AUTO: 0.3 %
KETONES UR STRIP.AUTO-MCNC: NEGATIVE MG/DL
LEUKOCYTE ESTERASE UR QL STRIP.AUTO: NEGATIVE
LIPASE SERPL-CCNC: 29 U/L (ref 20–51)
LYMPHOCYTES # BLD: 2.61 K/UL (ref 1.2–3.5)
LYMPHOCYTES NFR BLD: 26.5 %
MCH RBC QN AUTO: 29.1 PG (ref 28–33.2)
MCHC RBC AUTO-ENTMCNC: 33.6 G/DL (ref 32.2–36.5)
MCV RBC AUTO: 86.8 FL (ref 83–98)
MONOCYTES # BLD: 0.93 K/UL (ref 0.3–1)
MONOCYTES NFR BLD: 9.5 %
NEUTROPHILS # BLD: 5.51 K/UL (ref 1.7–7)
NEUTS SEG NFR BLD: 56 %
NITRITE UR QL STRIP.AUTO: NEGATIVE
NRBC BLD-RTO: 0 %
PDW BLD AUTO: 8.8 FL (ref 9.4–12.4)
PH UR STRIP.AUTO: 5.5 [PH]
PLATELET # BLD AUTO: 356 K/UL (ref 150–350)
POTASSIUM SERPL-SCNC: 3.6 MEQ/L (ref 3.6–5.1)
PROT SERPL-MCNC: 7.2 G/DL (ref 6–8.2)
PROT UR QL STRIP.AUTO: ABNORMAL
RBC # BLD AUTO: 5.15 M/UL (ref 4.5–5.8)
RBC #/AREA URNS HPF: ABNORMAL /HPF
SODIUM SERPL-SCNC: 135 MEQ/L (ref 136–144)
SP GR UR REFRACT.AUTO: >1.035
SQUAMOUS URNS QL MICRO: 1 /HPF
UROBILINOGEN UR STRIP-ACNC: 0.2 EU/DL
WBC # BLD AUTO: 9.84 K/UL (ref 3.8–10.5)
WBC #/AREA URNS HPF: ABNORMAL /HPF

## 2021-12-11 PROCEDURE — 36415 COLL VENOUS BLD VENIPUNCTURE: CPT

## 2021-12-11 PROCEDURE — 25800000 HC PHARMACY IV SOLUTIONS: Performed by: PHYSICIAN ASSISTANT

## 2021-12-11 PROCEDURE — 83690 ASSAY OF LIPASE: CPT

## 2021-12-11 PROCEDURE — 83690 ASSAY OF LIPASE: CPT | Performed by: EMERGENCY MEDICINE

## 2021-12-11 PROCEDURE — 74177 CT ABD & PELVIS W/CONTRAST: CPT | Mod: ME

## 2021-12-11 PROCEDURE — 99284 EMERGENCY DEPT VISIT MOD MDM: CPT | Mod: 25

## 2021-12-11 PROCEDURE — 85025 COMPLETE CBC W/AUTO DIFF WBC: CPT | Performed by: EMERGENCY MEDICINE

## 2021-12-11 PROCEDURE — 3E0337Z INTRODUCTION OF ELECTROLYTIC AND WATER BALANCE SUBSTANCE INTO PERIPHERAL VEIN, PERCUTANEOUS APPROACH: ICD-10-PCS | Performed by: EMERGENCY MEDICINE

## 2021-12-11 PROCEDURE — 96360 HYDRATION IV INFUSION INIT: CPT

## 2021-12-11 PROCEDURE — 81001 URINALYSIS AUTO W/SCOPE: CPT | Performed by: PHYSICIAN ASSISTANT

## 2021-12-11 PROCEDURE — 85025 COMPLETE CBC W/AUTO DIFF WBC: CPT

## 2021-12-11 PROCEDURE — 80053 COMPREHEN METABOLIC PANEL: CPT

## 2021-12-11 PROCEDURE — 80053 COMPREHEN METABOLIC PANEL: CPT | Performed by: EMERGENCY MEDICINE

## 2021-12-11 PROCEDURE — 63600105 HC IODINE BASED CONTRAST: Performed by: PHYSICIAN ASSISTANT

## 2021-12-11 RX ORDER — ONDANSETRON 4 MG/1
4 TABLET, ORALLY DISINTEGRATING ORAL EVERY 8 HOURS PRN
Qty: 12 TABLET | Refills: 0 | Status: SHIPPED | OUTPATIENT
Start: 2021-12-11

## 2021-12-11 RX ADMIN — SODIUM CHLORIDE 1000 ML: 9 INJECTION, SOLUTION INTRAVENOUS at 20:48

## 2021-12-11 RX ADMIN — IOHEXOL 125 ML: 350 INJECTION, SOLUTION INTRAVENOUS at 20:37

## 2021-12-11 ASSESSMENT — ENCOUNTER SYMPTOMS
AGITATION: 0
WEAKNESS: 0
WHEEZING: 0
FEVER: 0
NECK PAIN: 0
ACTIVITY CHANGE: 0
COLOR CHANGE: 0
NAUSEA: 0
DIFFICULTY URINATING: 0
HEMATURIA: 0
VOMITING: 0
ABDOMINAL PAIN: 0
FACIAL SWELLING: 0
BACK PAIN: 0
DIAPHORESIS: 0
SHORTNESS OF BREATH: 0
SORE THROAT: 0
DIARRHEA: 0
SEIZURES: 0
COUGH: 0
HEADACHES: 0
SPEECH DIFFICULTY: 0

## 2021-12-12 NOTE — DISCHARGE INSTRUCTIONS
A definite cause of your abdominal pain is not clear.  Your CT scan shows no acute abnormality.  Please return to the ER as needed for worsening symptoms.  We have sent a prescription for Zofran for nausea and vomiting to your pharmacy.

## 2021-12-12 NOTE — ED PROVIDER NOTES
Emergency Medicine Note  HPI   HISTORY OF PRESENT ILLNESS     HPI    Patient is a 36-year-old male with type 1 diabetes and hyperlipidemia history who presents the emergency department for evaluation of right lower quadrant abdominal pain, nausea, vomiting, diarrhea.  Patient has been having waxing and waning abdominal pain in the right lower quadrant since Monday, more persistent since Wednesday.  Denies any obvious exacerbating or alleviating factors.  Patient had multiple episodes of nausea vomiting and diarrhea on Thursday.  Also had an episode of vomiting this morning.  Does admit to decreased appetite.  Patient History   PAST HISTORY     Reviewed from Nursing Triage:       Past Medical History:   Diagnosis Date   • Diabetes mellitus type I (CMS/[facility])    • Hyperlipidemia        History reviewed. No pertinent surgical history.    Family History   Problem Relation Age of Onset   • No Known Problems Biological Mother    • Parkinsonism Biological Father    • No Known Problems Biological Sister        Social History     Tobacco Use   • Smoking status: Former Smoker   • Smokeless tobacco: Never Used   Substance Use Topics   • Alcohol use: Yes     Alcohol/week: 1.0 standard drink     Types: 1 Cans of beer per week   • Drug use: No         Review of Systems   REVIEW OF SYSTEMS     Review of Systems   Constitutional: Negative for activity change, diaphoresis and fever.   HENT: Negative for facial swelling and sore throat.    Eyes: Negative for visual disturbance.   Respiratory: Negative for cough, shortness of breath and wheezing.    Cardiovascular: Negative for chest pain and leg swelling.   Gastrointestinal: Negative for abdominal pain, diarrhea, nausea and vomiting.   Genitourinary: Negative for difficulty urinating and hematuria.   Musculoskeletal: Negative for back pain and neck pain.   Skin: Negative for color change and pallor.   Neurological: Negative for seizures, syncope, speech difficulty, weakness and  headaches.   Psychiatric/Behavioral: Negative for agitation and behavioral problems.   All other systems reviewed and are negative.        VITALS     ED Vitals    Date/Time Temp Pulse Resp BP SpO2 [facility]   12/11/21 1951 36.7 °C (98.1 °F) 90 18 113/61 98 % LDM                       Physical Exam   PHYSICAL EXAM     Physical Exam  Vitals and nursing note reviewed.   Constitutional:       Appearance: He is well-developed.   HENT:      Head: Normocephalic and atraumatic.   Eyes:      Conjunctiva/sclera: Conjunctivae normal.   Cardiovascular:      Rate and Rhythm: Normal rate and regular rhythm.   Pulmonary:      Effort: Pulmonary effort is normal.      Breath sounds: Normal breath sounds.   Abdominal:      General: There is no distension.      Palpations: Abdomen is soft. There is no mass.      Tenderness: There is abdominal tenderness in the right lower quadrant.   Musculoskeletal:         General: No tenderness or deformity. Normal range of motion.      Cervical back: Normal range of motion.   Skin:     General: Skin is warm and dry.   Neurological:      General: No focal deficit present.      Mental Status: He is alert. Mental status is at baseline.   Psychiatric:         Behavior: Behavior normal.           PROCEDURES     Procedures     DATA     Results     Procedure Component Value Units Date/Time    Comprehensive metabolic panel [[id number]]  (Abnormal) Collected: 12/11/21 1955    Specimen: Blood, Venous Updated: 12/11/21 2024     Sodium 135 mEQ/L      Potassium 3.6 mEQ/L      Comment: Results obtained on plasma. Plasma Potassium values may be up to 0.4 mEQ/L less than serum values. The differences may be greater for patients with high platelet or white cell counts.        Chloride 100 mEQ/L      CO2 28 mEQ/L      BUN 19 mg/dL      Creatinine 0.8 mg/dL      Glucose 130 mg/dL      Calcium 8.5 mg/dL      AST (SGOT) 14 IU/L      ALT (SGPT) 12 IU/L      Alkaline Phosphatase 86 IU/L      Total Protein 7.2 g/dL       Comment: Test performed on plasma which typically contains approximately 0.4 g/dL more protein than serum.        Albumin 4.0 g/dL      Bilirubin, Total 0.9 mg/dL      eGFR >60.0 mL/min/1.73m*2      Anion Gap 7 mEQ/L     Lipase [[id number]]  (Normal) Collected: 12/11/21 1955    Specimen: Blood, Venous Updated: 12/11/21 2024     Lipase 29 U/L     CBC and differential [[id number]]  (Abnormal) Collected: 12/11/21 1955    Specimen: Blood, Venous Updated: 12/11/21 2005     WBC 9.84 K/uL      RBC 5.15 M/uL      Hemoglobin 15.0 g/dL      Hematocrit 44.7 %      MCV 86.8 fL      MCH 29.1 pg      MCHC 33.6 g/dL      RDW 12.3 %      Platelets 356 K/uL      MPV 8.8 fL      Differential Type Auto     nRBC 0.0 %      Immature Granulocytes 0.3 %      Neutrophils 56.0 %      Lymphocytes 26.5 %      Monocytes 9.5 %      Eosinophils 7.0 %      Basophils 0.7 %      Immature Granulocytes, Absolute 0.03 K/uL      Neutrophils, Absolute 5.51 K/uL      Lymphocytes, Absolute 2.61 K/uL      Monocytes, Absolute 0.93 K/uL      Eosinophils, Absolute 0.69 K/uL      Basophils, Absolute 0.07 K/uL     RAINBOW GOLD [[id number]] Collected: 12/11/21 1955    Specimen: Blood, Venous Updated: 12/11/21 1959    RAINBOW LT BLUE [[id number]] Collected: 12/11/21 1955    Specimen: Blood, Venous Updated: 12/11/21 1959    [facility] Draw Panel [[id number]] Collected: 12/11/21 1955    Specimen: Blood, Venous Updated: 12/11/21 1959    Narrative:      The following orders were created for panel order [facility] Draw Panel.  Procedure                               Abnormality         Status                     ---------                               -----------         ------                     RAINBOW RED[[id number]]                                      In process                 RAINBOW LT BLUE[[id number]]                                  In process                 RAINBOW GOLD[[id number]]                                     In process                   Please view results for  these tests on the individual orders.    RAINBOW RED [[id number]] Collected: 12/11/21 1955    Specimen: Blood, Venous Updated: 12/11/21 1959          Imaging Results    None         No orders to display       Scoring tools                                 ED Course & MDM   MDM / ED COURSE and CLINICAL IMPRESSIONS     MDM    Clinical Impressions as of 12/11/21 2232   Right lower quadrant abdominal pain   Nausea and vomiting, intractability of vomiting not specified, unspecified vomiting type   Diarrhea, unspecified type            Grace Alejandra PA C  12/11/21 2033       Grace Alejandra PA C  12/11/21 2033       Grace Alejandra PA C  12/11/21 2232

## 2021-12-13 NOTE — ED ATTESTATION NOTE
The patient was evaluated and managed by the physician assistant / nurse practitioner. Discussed complaint, exam and results with pa/np and agree with assessment and plan. I was available to see the pt at the request of the pa/np or pt request.      Ricki Regan MD  12/13/21 2310

## 2022-04-02 ENCOUNTER — APPOINTMENT (OUTPATIENT)
Dept: LAB | Age: 37
End: 2022-04-02
Attending: INTERNAL MEDICINE
Payer: COMMERCIAL

## 2022-04-02 ENCOUNTER — TRANSCRIBE ORDERS (OUTPATIENT)
Dept: LAB | Age: 37
End: 2022-04-02

## 2022-04-02 DIAGNOSIS — E10.9 TYPE 1 DIABETES MELLITUS WITHOUT COMPLICATIONS (CMS/HCC): Primary | ICD-10-CM

## 2022-04-02 DIAGNOSIS — E78.5 HYPERLIPIDEMIA, UNSPECIFIED: ICD-10-CM

## 2022-04-02 DIAGNOSIS — E10.9 TYPE 1 DIABETES MELLITUS WITHOUT COMPLICATIONS (CMS/HCC): ICD-10-CM

## 2022-04-02 LAB
ALBUMIN/CREAT UR: 3.1 UG/MG
ANION GAP SERPL CALC-SCNC: 12 MEQ/L (ref 3–15)
BUN SERPL-MCNC: 16 MG/DL (ref 8–20)
CALCIUM SERPL-MCNC: 9.2 MG/DL (ref 8.9–10.3)
CHLORIDE SERPL-SCNC: 105 MEQ/L (ref 98–109)
CHOLEST SERPL-MCNC: 175 MG/DL
CO2 SERPL-SCNC: 25 MEQ/L (ref 22–32)
CREAT SERPL-MCNC: 1 MG/DL (ref 0.8–1.3)
CREAT UR-MCNC: 223.5 MG/DL
EST. AVERAGE GLUCOSE BLD GHB EST-MCNC: 194 MG/DL
GFR SERPL CREATININE-BSD FRML MDRD: >60 ML/MIN/1.73M*2
GLUCOSE SERPL-MCNC: 82 MG/DL (ref 70–99)
HBA1C MFR BLD HPLC: 8.4 %
HDLC SERPL-MCNC: 59 MG/DL
HDLC SERPL: 3 {RATIO}
LDLC SERPL CALC-MCNC: 108 MG/DL
MICROALBUMIN UR-MCNC: 7 MG/L
NONHDLC SERPL-MCNC: 116 MG/DL
POTASSIUM SERPL-SCNC: 4.3 MEQ/L (ref 3.6–5.1)
SODIUM SERPL-SCNC: 142 MEQ/L (ref 136–144)
TRIGL SERPL-MCNC: 41 MG/DL (ref 30–149)
TSH SERPL DL<=0.05 MIU/L-ACNC: 1.39 MIU/L (ref 0.34–5.6)

## 2022-04-02 PROCEDURE — 84443 ASSAY THYROID STIM HORMONE: CPT

## 2022-04-02 PROCEDURE — 36415 COLL VENOUS BLD VENIPUNCTURE: CPT

## 2022-04-02 PROCEDURE — 83036 HEMOGLOBIN GLYCOSYLATED A1C: CPT

## 2022-04-02 PROCEDURE — 82043 UR ALBUMIN QUANTITATIVE: CPT

## 2022-04-02 PROCEDURE — 80061 LIPID PANEL: CPT

## 2022-04-02 PROCEDURE — 80048 BASIC METABOLIC PNL TOTAL CA: CPT

## 2023-03-15 ENCOUNTER — OFFICE VISIT (OUTPATIENT)
Dept: FAMILY MEDICINE CLINIC | Facility: CLINIC | Age: 38
End: 2023-03-15

## 2023-03-15 VITALS
RESPIRATION RATE: 17 BRPM | TEMPERATURE: 97.7 F | SYSTOLIC BLOOD PRESSURE: 124 MMHG | HEART RATE: 68 BPM | HEIGHT: 66 IN | OXYGEN SATURATION: 99 % | WEIGHT: 198.2 LBS | DIASTOLIC BLOOD PRESSURE: 78 MMHG | BODY MASS INDEX: 31.85 KG/M2

## 2023-03-15 DIAGNOSIS — E66.9 OBESITY (BMI 30-39.9): ICD-10-CM

## 2023-03-15 DIAGNOSIS — E10.9 TYPE 1 DIABETES MELLITUS WITHOUT COMPLICATION (HCC): Primary | ICD-10-CM

## 2023-03-15 DIAGNOSIS — E78.2 MIXED HYPERLIPIDEMIA: ICD-10-CM

## 2023-03-15 DIAGNOSIS — Z23 NEED FOR VACCINATION: ICD-10-CM

## 2023-03-15 RX ORDER — BLOOD-GLUCOSE METER
KIT MISCELLANEOUS 4 TIMES DAILY
COMMUNITY
Start: 2023-03-07

## 2023-03-15 RX ORDER — INSULIN PUMP CONTROLLER
EACH MISCELLANEOUS
COMMUNITY
Start: 2023-02-03

## 2023-03-15 RX ORDER — ATORVASTATIN CALCIUM 20 MG/1
20 TABLET, FILM COATED ORAL DAILY
COMMUNITY

## 2023-03-15 RX ORDER — INSULIN LISPRO 100 [IU]/ML
INJECTION, SOLUTION INTRAVENOUS; SUBCUTANEOUS
COMMUNITY

## 2023-03-15 NOTE — PROGRESS NOTES
Assessment/Plan:  Problem List Items Addressed This Visit        Endocrine    Type 1 diabetes mellitus without complication ([facility] [address] ) - Primary       No results found for: HGBA1C     The patient is stable on  his current insulin pump therapy  We will send him for the lab work as indicated  He will continue to follow-up with his endocrinologist at [facility] as scheduled  He will continue to work on improving his diet and exercise and weight  We will see him back in the office as scheduled  Relevant Medications    Insulin Lispro (HumaLOG) 100 units/mL cartridge for injection    Other Relevant Orders    CBC and differential    Comprehensive metabolic panel    LDL cholesterol, direct    Lipid panel    Microalbumin / creatinine urine ratio       Other    Mixed hyperlipidemia     The patient will continue with the current dose of his atorvastatin  He will continue to work on improving his diet and exercise  He will go testing as indicated  Relevant Medications    atorvastatin (LIPITOR) 20 mg tablet    Other Relevant Orders    CBC and differential    Comprehensive metabolic panel    LDL cholesterol, direct    Lipid panel    Microalbumin / creatinine urine ratio    Obesity (BMI 30-39  9)     I advised him to start weighing himself daily to track his weight  The patient was advised to start eating 7 non starchy vegetables and 3 fruits every day as well as 10-12 nuts per day  He was also advised to add on psyllium fiber 1 tbsp twice a day for goal of 13 g per day  He was advised to drink 16 oz of water before all meals and to avoid any artificially sweetened drinks  He was also advised to try high intensity interval training for 4 minutes per day along with resistance exercises  I also advised him to keep a food diary to track everything that he is eating in the course of the day    At meals, he should always cut his dish in  half and eat half  his meal and then determine if he is still hungry prior to eating the additional half  We will see him back in the office as scheduled  Other Visit Diagnoses     Need for vaccination        Relevant Orders    Pneumococcal Conjugate Vaccine 20-valent (Pcv20) (Completed)          Return in about 4 months (around 7/15/2023) for Annual physical    I spent 20 minutes during the visit reviewing the history from the patient, performing the examination, discussing the findings with the patient, providing counseling and education, and making a plan  I spent 15 minutes ordering referrals and testing and documenting  Subjective:   Chief Complaint   Patient presents with   • Establish Care     New Patient check         Patient ID: Paolo Mckeon is a 44 y o  male presents today for routine checkup  Paolo Mckeon is a 44 y o  male who presents today to establish as a new patient with a history of type 1 diabetes, hyperlipidemia, and obesity  He is doing well on his current medications  He is currently seeing an endocrinologist at [facility]   Just to continue with his current endocrinologist for now  He is feeling well on his medications  He sees the endocrinologist on 4/26 to go over his numbers  He was diagnosed at age 22  He is going for diabetes teaching  He has the atorvastatin- bur forgets to take it  He may be switched to the Dexcom  He has fullness in the left ear  He has had 2 covid shots a a booster  The patient denies any chest pain, shortness of breath, or palpitations  There is no edema  There are no headaches or visual changes  There is no lightheadedness, dizziness, or fainting spells  The patient currently denies any nausea, vomiting, or GERD symptoms  he has normal bowel movements and normal urine output  he has a normal appetite  There are no chronic heartburn  Matt Engel He is scheduled to have labs done  Hyperlipidemia  This is a chronic problem  The current episode started more than 1 year ago  The problem is controlled  Recent lipid tests were reviewed and are normal  He has no history of chronic renal disease, diabetes, hypothyroidism, liver disease, obesity or nephrotic syndrome  Pertinent negatives include no chest pain  Diabetes  He presents for his follow-up diabetic visit  He has type 2 diabetes mellitus  His disease course has been stable  Pertinent negatives for diabetes include no blurred vision, no chest pain, no fatigue, no foot paresthesias, no foot ulcerations, no polydipsia, no polyphagia, no polyuria, no visual change, no weakness and no weight loss  The following portions of the patient's history were reviewed and updated as appropriate: allergies, current medications, past family history, past medical history, past social history, past surgical history and problem list   Patient Active Problem List   Diagnosis   • Type 1 diabetes mellitus without complication ([address] )   • Mixed hyperlipidemia   • Obesity (BMI 30-39  9)     History reviewed  No pertinent past medical history  Past Surgical History:   Procedure Laterality Date   • DENTAL SURGERY       Allergies   Allergen Reactions   • Naproxen Shortness Of Breath     Family History   Problem Relation Age of Onset   • No Known Problems Mother    • Cancer Father    • Glaucoma Maternal Grandmother      Social History     Socioeconomic History   • Marital status: /Civil Union     Spouse name: Not on file   • Number of children: Not on file   • Years of education: Not on file   • Highest education level: Not on file   Occupational History   • Not on file   Tobacco Use   • Smoking status: Former     Types: Cigarettes   • Smokeless tobacco: Never   • Tobacco comments:     He quit at age 11  Vaping Use   • Vaping Use: Never used   Substance and Sexual Activity   • Alcohol use:  Yes     Alcohol/week: 3 0 standard drinks     Types: 1 Glasses of wine, 1 Cans of beer, 1 Standard drinks or equivalent per week     Comment: occasional   • Drug use: Never   • Sexual activity: Not on file   Other Topics Concern   • Not on file   Social History Narrative   • Not on file     Social Determinants of Health     Financial Resource Strain: Not on file   Food Insecurity: Not on file   Transportation Needs: Not on file   Physical Activity: Not on file   Stress: Not on file   Social Connections: Not on file   Intimate Partner Violence: Not At Risk   • Fear of Current or Ex-Partner: No   • Emotionally Abused: No   • Physically Abused: No   • Sexually Abused: No   Housing Stability: Not on file     Current Outpatient Medications on File Prior to Visit   Medication Sig Dispense Refill   • atorvastatin (LIPITOR) 20 mg tablet Take 20 mg by mouth daily     • Continuous Blood Gluc Sensor (FreeStyle Rock 2 Sensor) MISC CHANGE SENSOR EVERY 14 DAYS AS DIRECTED     • FREESTYLE LITE test strip 4 (four) times a day Use as directed     • Insulin Disposable Pump (Omnipod DASH Pods, Gen 4,) MISC AS DIRECTED VIA PUMP DAILY 90     • Insulin Lispro (HumaLOG) 100 units/mL cartridge for injection Inject under the skin       No current facility-administered medications on file prior to visit  Review of Systems   Constitutional: Negative  Negative for fatigue and weight loss  HENT: Negative  Eyes: Negative  Negative for blurred vision  Respiratory: Negative  Cardiovascular: Negative  Negative for chest pain  Gastrointestinal: Negative  Endocrine: Negative  Negative for polydipsia, polyphagia and polyuria  Genitourinary: Negative  Musculoskeletal: Negative  Skin: Negative  Allergic/Immunologic: Negative  Neurological: Negative  Negative for weakness  Hematological: Negative  Psychiatric/Behavioral: Negative          Objective:  Vitals:    03/15/23 1410 03/15/23 1458   BP: 142/92 124/78   BP Location: Right arm    Patient Position: Sitting    Cuff Size: Large    Pulse: 93 68   Resp: 17    Temp: 97 7 °F (36 5 °C)    TempSrc: Tympanic    SpO2: 99%    Weight: 89 9 kg (198 lb 3 2 oz)    Height: 5' 6 2" (1 681 m)      Body mass index is 31 8 kg/m²  Physical Exam  Vitals and nursing note reviewed  Constitutional:       General: He is not in acute distress  Appearance: He is well-developed  He is not diaphoretic  Eyes:      Pupils: Pupils are equal, round, and reactive to light  Neck:      Thyroid: No thyromegaly  Vascular: No JVD  Trachea: No tracheal deviation  Cardiovascular:      Rate and Rhythm: Normal rate and regular rhythm  Heart sounds: Normal heart sounds  No murmur heard  No friction rub  No gallop  Pulmonary:      Effort: Pulmonary effort is normal  No respiratory distress  Breath sounds: Normal breath sounds  No stridor  No wheezing or rales  Chest:      Chest wall: No tenderness  Abdominal:      General: Bowel sounds are normal  There is no distension  Palpations: Abdomen is soft  There is no mass  Tenderness: There is no abdominal tenderness  There is no guarding or rebound  Musculoskeletal:         General: Normal range of motion  Cervical back: Normal range of motion and neck supple  Lymphadenopathy:      Cervical: No cervical adenopathy  Skin:     General: Skin is warm and dry  Coloration: Skin is not pale  Findings: No erythema or rash  Neurological:      Mental Status: He is alert and oriented to person, place, and time  Cranial Nerves: No cranial nerve deficit  Motor: No abnormal muscle tone  Coordination: Coordination normal       Deep Tendon Reflexes: Reflexes are normal and symmetric  Reflexes normal            BMI Counseling: Body mass index is 31 8 kg/m²   The BMI is above normal  Nutrition recommendations include decreasing portion sizes, encouraging healthy choices of fruits and vegetables, decreasing fast food intake, consuming healthier snacks, limiting drinks that contain sugar, moderation in carbohydrate intake, increasing intake of lean protein, reducing intake of saturated and trans fat and reducing intake of cholesterol  Exercise recommendations include exercising 3-5 times per week and strength training exercises  No pharmacotherapy was ordered  Patient referred to PCP  Rationale for BMI follow-up plan is due to patient being overweight or obese  Depression Screening and Follow-up Plan: Patient was screened for depression during today's encounter  They screened negative with a PHQ-2 score of 0

## 2023-03-16 PROBLEM — E66.9 OBESITY (BMI 30-39.9): Status: ACTIVE | Noted: 2023-03-16

## 2023-03-17 NOTE — ASSESSMENT & PLAN NOTE
The patient will continue with the current dose of his atorvastatin  He will continue to work on improving his diet and exercise  He will go testing as indicated

## 2023-03-17 NOTE — ASSESSMENT & PLAN NOTE
No results found for: HGBA1C     The patient is stable on  his current insulin pump therapy  We will send him for the lab work as indicated  He will continue to follow-up with his [facility]ologist [facility] as scheduled  He will continue to work on improving his diet and exercise and weight  We will see him back in the office as scheduled

## 2023-04-22 ENCOUNTER — APPOINTMENT (OUTPATIENT)
Dept: LAB | Facility: CLINIC | Age: 38
End: 2023-04-22

## 2023-04-22 DIAGNOSIS — E78.2 MIXED HYPERLIPIDEMIA: ICD-10-CM

## 2023-04-22 DIAGNOSIS — E10.9 TYPE 1 DIABETES MELLITUS WITHOUT COMPLICATION (HCC): ICD-10-CM

## 2023-04-22 DIAGNOSIS — E66.9 OBESITY (BMI 30.0-34.9): ICD-10-CM

## 2023-04-22 DIAGNOSIS — E78.5 HYPERLIPIDEMIA, UNSPECIFIED HYPERLIPIDEMIA TYPE: ICD-10-CM

## 2023-04-22 LAB
ALBUMIN SERPL BCP-MCNC: 3.5 G/DL (ref 3.5–5)
ALP SERPL-CCNC: 107 U/L (ref 46–116)
ALT SERPL W P-5'-P-CCNC: 18 U/L (ref 12–78)
ANION GAP SERPL CALCULATED.3IONS-SCNC: 3 MMOL/L (ref 4–13)
AST SERPL W P-5'-P-CCNC: 11 U/L (ref 5–45)
BASOPHILS # BLD AUTO: 0.08 THOUSANDS/ΜL (ref 0–0.1)
BASOPHILS NFR BLD AUTO: 1 % (ref 0–1)
BILIRUB SERPL-MCNC: 0.33 MG/DL (ref 0.2–1)
BUN SERPL-MCNC: 18 MG/DL (ref 5–25)
CALCIUM SERPL-MCNC: 9.3 MG/DL (ref 8.3–10.1)
CHLORIDE SERPL-SCNC: 105 MMOL/L (ref 96–108)
CHOLEST SERPL-MCNC: 156 MG/DL
CO2 SERPL-SCNC: 27 MMOL/L (ref 21–32)
CREAT SERPL-MCNC: 1.07 MG/DL (ref 0.6–1.3)
EOSINOPHIL # BLD AUTO: 0.49 THOUSAND/ΜL (ref 0–0.61)
EOSINOPHIL NFR BLD AUTO: 6 % (ref 0–6)
ERYTHROCYTE [DISTWIDTH] IN BLOOD BY AUTOMATED COUNT: 13 % (ref 11.6–15.1)
EST. AVERAGE GLUCOSE BLD GHB EST-MCNC: 177 MG/DL
GFR SERPL CREATININE-BSD FRML MDRD: 87 ML/MIN/1.73SQ M
GLUCOSE P FAST SERPL-MCNC: 271 MG/DL (ref 65–99)
HBA1C MFR BLD: 7.8 %
HCT VFR BLD AUTO: 43.6 % (ref 36.5–49.3)
HDLC SERPL-MCNC: 55 MG/DL
HGB BLD-MCNC: 14.3 G/DL (ref 12–17)
IMM GRANULOCYTES # BLD AUTO: 0.02 THOUSAND/UL (ref 0–0.2)
IMM GRANULOCYTES NFR BLD AUTO: 0 % (ref 0–2)
LDLC SERPL CALC-MCNC: 81 MG/DL (ref 0–100)
LDLC SERPL DIRECT ASSAY-MCNC: 90 MG/DL (ref 0–100)
LYMPHOCYTES # BLD AUTO: 2.01 THOUSANDS/ΜL (ref 0.6–4.47)
LYMPHOCYTES NFR BLD AUTO: 25 % (ref 14–44)
MCH RBC QN AUTO: 28.1 PG (ref 26.8–34.3)
MCHC RBC AUTO-ENTMCNC: 32.8 G/DL (ref 31.4–37.4)
MCV RBC AUTO: 86 FL (ref 82–98)
MONOCYTES # BLD AUTO: 0.65 THOUSAND/ΜL (ref 0.17–1.22)
MONOCYTES NFR BLD AUTO: 8 % (ref 4–12)
NEUTROPHILS # BLD AUTO: 4.66 THOUSANDS/ΜL (ref 1.85–7.62)
NEUTS SEG NFR BLD AUTO: 60 % (ref 43–75)
NONHDLC SERPL-MCNC: 101 MG/DL
NRBC BLD AUTO-RTO: 0 /100 WBCS
PLATELET # BLD AUTO: 337 THOUSANDS/UL (ref 149–390)
PMV BLD AUTO: 8.9 FL (ref 8.9–12.7)
POTASSIUM SERPL-SCNC: 4.4 MMOL/L (ref 3.5–5.3)
PROT SERPL-MCNC: 7 G/DL (ref 6.4–8.4)
RBC # BLD AUTO: 5.08 MILLION/UL (ref 3.88–5.62)
SODIUM SERPL-SCNC: 135 MMOL/L (ref 135–147)
TRIGL SERPL-MCNC: 100 MG/DL
TSH SERPL DL<=0.05 MIU/L-ACNC: 1.89 UIU/ML (ref 0.45–4.5)
WBC # BLD AUTO: 7.91 THOUSAND/UL (ref 4.31–10.16)

## 2023-07-17 ENCOUNTER — OFFICE VISIT (OUTPATIENT)
Dept: FAMILY MEDICINE CLINIC | Facility: CLINIC | Age: 38
End: 2023-07-17
Payer: COMMERCIAL

## 2023-07-17 VITALS
WEIGHT: 205.2 LBS | HEIGHT: 66 IN | BODY MASS INDEX: 32.98 KG/M2 | SYSTOLIC BLOOD PRESSURE: 116 MMHG | RESPIRATION RATE: 17 BRPM | HEART RATE: 95 BPM | DIASTOLIC BLOOD PRESSURE: 78 MMHG | OXYGEN SATURATION: 97 % | TEMPERATURE: 99 F

## 2023-07-17 DIAGNOSIS — E66.9 OBESITY (BMI 30-39.9): ICD-10-CM

## 2023-07-17 DIAGNOSIS — E10.9 TYPE 1 DIABETES MELLITUS WITHOUT COMPLICATION (HCC): ICD-10-CM

## 2023-07-17 DIAGNOSIS — Z00.00 ANNUAL PHYSICAL EXAM: Primary | ICD-10-CM

## 2023-07-17 DIAGNOSIS — R10.11 RIGHT UPPER QUADRANT PAIN: ICD-10-CM

## 2023-07-17 DIAGNOSIS — K21.9 GASTROESOPHAGEAL REFLUX DISEASE WITHOUT ESOPHAGITIS: ICD-10-CM

## 2023-07-17 PROCEDURE — 99395 PREV VISIT EST AGE 18-39: CPT | Performed by: FAMILY MEDICINE

## 2023-07-17 RX ORDER — OMEPRAZOLE 20 MG/1
20 CAPSULE, DELAYED RELEASE ORAL DAILY
Qty: 30 CAPSULE | Refills: 1 | Status: SHIPPED | OUTPATIENT
Start: 2023-07-17

## 2023-07-17 NOTE — PROGRESS NOTES
[address] [facility]    NAME: Marjorie Harris  AGE: 43 y.o. SEX: male  : 1985     DATE: 2023     Assessment and Plan:     Problem List Items Addressed This Visit        Digestive    Gastroesophageal reflux disease without esophagitis     The patient is having symptoms consistent with GERD. We will start him on the omeprazole as ordered and we will also send him for the ultrasound as indicated. He will try to limit his intake of spicy and fatty foods and eat smaller more frequent meals. We will see him back in the office as scheduled. Relevant Medications    omeprazole (PriLOSEC) 20 mg delayed release capsule    Other Relevant Orders    US abdomen complete       Endocrine    Type 1 diabetes mellitus without complication (HCC)       Lab Results   Component Value Date    HGBA1C 7.8 (H) 2023   The patient is stable on his current medication. He wishes to establish with an endocrinologist more local.  We will refer him to [facility] endocrinology as ordered. He will continue with his current medication. We will see him back as scheduled. Relevant Orders    Ambulatory Referral to Endocrinology       Other    Obesity (BMI 30-39. 9)     I advised him to start weighing himself daily to track his weight. The patient was advised to start eating 7 non starchy vegetables and 3 fruits every day as well as 10-12 nuts per day. He was also advised to add on psyllium fiber 1 tbsp twice a day for goal of 13 g per day. He was advised to drink 16 oz of water before all meals and to avoid any artificially sweetened drinks. He was also advised to try high intensity interval training for 4 minutes per day along with resistance exercises. I also advised him to keep a food diary to track everything that he is eating in the course of the day.   At meals, he should always cut his dish in  half and eat half  his meal and then determine if he is still hungry prior to eating the additional half. We will see him back in the office as scheduled. Other Visit Diagnoses     Annual physical exam    -  Primary    Right upper quadrant pain        Relevant Orders    US abdomen complete          Immunizations and preventive care screenings were discussed with patient today. Appropriate education was printed on patient's after visit summary. Counseling:  Dental Health: discussed importance of regular tooth brushing, flossing, and dental visits. Injury prevention: discussed safety/seat belts, safety helmets, smoke detectors, carbon dioxide detectors, and smoking near bedding or upholstery. Exercise: the importance of regular exercise/physical activity was discussed. Recommend exercise 3-5 times per week for at least 30 minutes. Return in about 2 months (around 9/17/2023) for Recheck. Chief Complaint:     Chief Complaint   Patient presents with   • Annual Exam     No questions or concerns at this time. History of Present Illness:     Adult Annual Physical   Patient here for a comprehensive physical exam. The patient reports no problems. He is seeing the endocrinologist in [place] and wants one closer. The patient had an episode of abdominal pain 2 weeks ago radiating to the right upper chest area mid sternal. He has had heartburn issues before. The episode was an hour after eating. He has heartburn several times a week for a while. There is no early satiety and there is no dysphagia. There is rare nausea and vomiting. There is occasionally gas and bloating. His BS was low at the time. Diet and Physical Activity  Diet/Nutrition: well balanced diet, diabetic diet, low fat diet, low carb diet and consuming 3-5 servings of fruits/vegetables daily. Exercise: walking and he needs to exercise more. .      Depression Screening  PHQ-2/9 Depression Screening         General Health  Sleep: sleeps well.    Hearing: normal - bilateral.  Vision: no vision problems, goes for regular eye exams, most recent eye exam >1 year ago and wears glasses. Dental: regular dental visits and brushes teeth twice daily.  Health  History of STDs?: no.     Review of Systems:     Review of Systems   Constitutional: Negative. HENT: Negative. Eyes: Negative. Respiratory: Negative. Cardiovascular: Negative. Gastrointestinal: Negative. Endocrine: Negative. Genitourinary: Negative. Musculoskeletal: Negative. Skin: Negative. Allergic/Immunologic: Negative. Neurological: Negative. Hematological: Negative. Psychiatric/Behavioral: Negative. Past Medical History:     History reviewed. No pertinent past medical history. Past Surgical History:     Past Surgical History:   Procedure Laterality Date   • DENTAL SURGERY        Social History:     Social History     Socioeconomic History   • Marital status: /Civil Union     Spouse name: None   • Number of children: None   • Years of education: None   • Highest education level: None   Occupational History   • None   Tobacco Use   • Smoking status: Former     Types: Cigarettes   • Smokeless tobacco: Never   • Tobacco comments:     He quit at age 11. Vaping Use   • Vaping Use: Never used   Substance and Sexual Activity   • Alcohol use:  Yes     Alcohol/week: 3.0 standard drinks of alcohol     Types: 1 Glasses of wine, 1 Cans of beer, 1 Standard drinks or equivalent per week     Comment: occasional   • Drug use: Never   • Sexual activity: None   Other Topics Concern   • None   Social History Narrative   • None     Social Determinants of Health     Financial Resource Strain: Not on file   Food Insecurity: Not on file   Transportation Needs: Not on file   Physical Activity: Not on file   Stress: Not on file   Social Connections: Not on file   Intimate Partner Violence: Not At Risk (3/15/2023)    Humiliation, Afraid, Rape, and Kick questionnaire    • Fear of Current or Ex-Partner: No    • Emotionally Abused: No    • Physically Abused: No    • Sexually Abused: No   Housing Stability: Not on file      Family History:     Family History   Problem Relation Age of Onset   • No Known Problems Mother    • Cancer Father    • Glaucoma Maternal Grandmother       Current Medications:     Current Outpatient Medications   Medication Sig Dispense Refill   • atorvastatin (LIPITOR) 20 mg tablet Take 20 mg by mouth daily     • Continuous Blood Gluc Sensor (FreeStyle Rock 2 Sensor) MISC CHANGE SENSOR EVERY 14 DAYS AS DIRECTED     • FREESTYLE LITE test strip 4 (four) times a day Use as directed     • Insulin Lispro (HumaLOG) 100 units/mL cartridge for injection Inject under the skin     • omeprazole (PriLOSEC) 20 mg delayed release capsule Take 1 capsule (20 mg total) by mouth daily 30 capsule 1   • Insulin Disposable Pump (Omnipod DASH Pods, Gen 4,) MISC AS DIRECTED VIA PUMP DAILY 90       No current facility-administered medications for this visit. Allergies: Allergies   Allergen Reactions   • Naproxen Shortness Of Breath      Physical Exam:     /78 (BP Location: Left arm, Patient Position: Sitting, Cuff Size: Large)   Pulse 95   Temp 99 °F (37.2 °C) (Tympanic)   Resp 17   Ht 5' 6.2" (1.681 m)   Wt 93.1 kg (205 lb 3.2 oz)   SpO2 97%   BMI 32.92 kg/m²     Physical Exam  Vitals and nursing note reviewed. Exam conducted with a chaperone present. Constitutional:       Appearance: He is well-developed. HENT:      Head: Normocephalic and atraumatic. Right Ear: External ear normal.      Left Ear: External ear normal.      Nose: Nose normal.      Mouth/Throat:      Pharynx: No oropharyngeal exudate. Eyes:      Conjunctiva/sclera: Conjunctivae normal.      Pupils: Pupils are equal, round, and reactive to light. Neck:      Thyroid: No thyromegaly. Trachea: No tracheal deviation. Cardiovascular:      Rate and Rhythm: Normal rate and regular rhythm. Pulses: no weak pulses          Dorsalis pedis pulses are 2+ on the right side and 2+ on the left side. Posterior tibial pulses are 2+ on the right side and 2+ on the left side. Heart sounds: Normal heart sounds. No murmur heard. No friction rub. No gallop. Pulmonary:      Effort: Pulmonary effort is normal. No respiratory distress. Breath sounds: Normal breath sounds. No stridor. No wheezing or rales. Chest:      Chest wall: No tenderness. Abdominal:      General: Bowel sounds are normal. There is no distension. Palpations: Abdomen is soft. There is no mass. Tenderness: There is no abdominal tenderness. There is no guarding or rebound. Hernia: No hernia is present. Genitourinary:     Penis: Normal. No tenderness. Testes: Normal.      Comments: There are no palpable hernias. Musculoskeletal:         General: No tenderness or deformity. Normal range of motion. Cervical back: Normal range of motion and neck supple. Feet:      Right foot:      Skin integrity: No ulcer, skin breakdown, erythema, warmth, callus or dry skin. Left foot:      Skin integrity: No ulcer, skin breakdown, erythema, warmth, callus or dry skin. Lymphadenopathy:      Cervical: No cervical adenopathy. Skin:     General: Skin is warm and dry. Coloration: Skin is not pale. Findings: No erythema or rash. Neurological:      Mental Status: He is alert and oriented to person, place, and time. Cranial Nerves: No cranial nerve deficit. Sensory: No sensory deficit. Motor: No abnormal muscle tone. Coordination: Coordination normal.      Deep Tendon Reflexes: Reflexes normal.   Psychiatric:         Behavior: Behavior normal.         Thought Content: Thought content normal.         Judgment: Judgment normal.        Diabetic Foot Exam    Patient's shoes and socks removed. Right Foot/Ankle   Right Foot Inspection  Skin Exam: skin normal and skin intact.  No dry skin, no warmth, no callus, no erythema, no maceration, no abnormal color, no pre-ulcer, no ulcer and no callus. Toe Exam: ROM and strength within normal limits. Sensory   Vibration: intact  Proprioception: intact  Monofilament testing: intact    Vascular  Capillary refills: < 3 seconds  The right DP pulse is 2+. The right PT pulse is 2+. Left Foot/Ankle  Left Foot Inspection  Skin Exam: skin normal and skin intact. No dry skin, no warmth, no erythema, no maceration, normal color, no pre-ulcer, no ulcer and no callus. Toe Exam: ROM and strength within normal limits. Sensory   Vibration: intact  Proprioception: intact  Monofilament testing: intact    Vascular  Capillary refills: < 3 seconds  The left DP pulse is 2+. The left PT pulse is 2+.      Assign Risk Category  No deformity present  No loss of protective sensation  No weak pulses  Risk: 0    Violeta Tanner DO   [address] [facility]

## 2023-07-19 PROBLEM — K21.9 GASTROESOPHAGEAL REFLUX DISEASE WITHOUT ESOPHAGITIS: Status: ACTIVE | Noted: 2023-07-19

## 2023-07-19 NOTE — ASSESSMENT & PLAN NOTE
The patient is having symptoms consistent with GERD. We will start him on the omeprazole as ordered and we will also send him for the ultrasound as indicated. He will try to limit his intake of spicy and fatty foods and eat smaller more frequent meals. We will see him back in the office as scheduled.

## 2023-07-19 NOTE — ASSESSMENT & PLAN NOTE
Lab Results   Component Value Date    HGBA1C 7.8 (H) 04/22/2023   The patient is stable on his current medication. He wishes to establish with an endocrinologist more local.  We will refer him to [facility] endocrinology as ordered. He will continue with his current medication. We will see him back as scheduled.

## 2023-07-19 NOTE — ASSESSMENT & PLAN NOTE
I advised him to start weighing himself daily to track his weight. The patient was advised to start eating 7 non starchy vegetables and 3 fruits every day as well as 10-12 nuts per day. He was also advised to add on psyllium fiber 1 tbsp twice a day for goal of 13 g per day. He was advised to drink 16 oz of water before all meals and to avoid any artificially sweetened drinks. He was also advised to try high intensity interval training for 4 minutes per day along with resistance exercises. I also advised him to keep a food diary to track everything that he is eating in the course of the day. At meals, he should always cut his dish in  half and eat half  his meal and then determine if he is still hungry prior to eating the additional half. We will see him back in the office as scheduled.

## 2023-08-15 DIAGNOSIS — K21.9 GASTROESOPHAGEAL REFLUX DISEASE WITHOUT ESOPHAGITIS: ICD-10-CM

## 2023-08-15 RX ORDER — OMEPRAZOLE 20 MG/1
20 CAPSULE, DELAYED RELEASE ORAL DAILY
Qty: 90 CAPSULE | Refills: 1 | Status: SHIPPED | OUTPATIENT
Start: 2023-08-15

## 2023-08-16 ENCOUNTER — CONSULT (OUTPATIENT)
Dept: ENDOCRINOLOGY | Facility: CLINIC | Age: 38
End: 2023-08-16
Payer: COMMERCIAL

## 2023-08-16 VITALS
WEIGHT: 208.6 LBS | HEIGHT: 66 IN | DIASTOLIC BLOOD PRESSURE: 74 MMHG | HEART RATE: 88 BPM | SYSTOLIC BLOOD PRESSURE: 110 MMHG | BODY MASS INDEX: 33.52 KG/M2

## 2023-08-16 DIAGNOSIS — E10.65 TYPE 1 DIABETES MELLITUS WITH HYPERGLYCEMIA (HCC): Primary | ICD-10-CM

## 2023-08-16 LAB — SL AMB POCT HEMOGLOBIN AIC: 7.9 (ref ?–6.5)

## 2023-08-16 PROCEDURE — 99204 OFFICE O/P NEW MOD 45 MIN: CPT | Performed by: INTERNAL MEDICINE

## 2023-08-16 PROCEDURE — 83036 HEMOGLOBIN GLYCOSYLATED A1C: CPT | Performed by: INTERNAL MEDICINE

## 2023-08-16 PROCEDURE — 95251 CONT GLUC MNTR ANALYSIS I&R: CPT | Performed by: INTERNAL MEDICINE

## 2023-08-16 RX ORDER — IBUPROFEN 600 MG/1
1 TABLET ORAL AS NEEDED
Qty: 1 KIT | Refills: 1 | Status: SHIPPED | OUTPATIENT
Start: 2023-08-16

## 2023-08-16 RX ORDER — METFORMIN HYDROCHLORIDE 500 MG/1
1000 TABLET, EXTENDED RELEASE ORAL 2 TIMES DAILY WITH MEALS
Qty: 90 TABLET | Refills: 6 | Status: SHIPPED | OUTPATIENT
Start: 2023-08-16

## 2023-08-16 RX ORDER — PROCHLORPERAZINE 25 MG/1
1 SUPPOSITORY RECTAL
Qty: 9 EACH | Refills: 3 | Status: SHIPPED | OUTPATIENT
Start: 2023-08-16

## 2023-08-16 RX ORDER — INSULIN LISPRO 100 [IU]/ML
40 INJECTION, SOLUTION INTRAVENOUS; SUBCUTANEOUS DAILY
Qty: 5 ML | Refills: 6 | Status: SHIPPED | OUTPATIENT
Start: 2023-08-16

## 2023-08-16 NOTE — PROGRESS NOTES
Vladislav Girard 43 y.o. male MRN: [id number]    Encounter: [id number]      Assessment/Plan     This is a 43y.o.-year-old male with 20 years of type 1 diabetes mellitus with hyperglycemia, managed on OmniPod insulin pump and freestyle kyree 3 monitor, setting to establish care. CGM date reviewed, postprandial hyperglycemia with occasional overcorrection. Do suspect a degree of insulin resistance given quantity of insulin being used. - will initiate metformin for insulin resistance, 500mg daily uptitrated to 1000mg BID  - Patient has been having a lot of connectivity issues/reliability issues with his freestyle kyree 3  - Will order Dexcom G6 CGM  - Continue insulin pump  OmniPod Dash at current rate, bolusing  - Diabetes education for CGM, carb counting   - New Rx for rescue glucagon IM  - New Rx for ketone strips  - Does have backup basal bolus at home      CC: DM1    History of Present Illness     HPI:  41yo male with past medical history significant for DM1 on insulin pump, HLD, obesity. New to area, presenting to establish care for DM1. Previously seeing endocrinology at [facility] due to insurance. Last A1c 7.8 in April, improved from 8.4 in April 2022. 7.9 today. Patient diagnosed age 22. Maintained on basal bolus for majority 20 years. Patient is on a Omnipod DASH pump prescribed by last endocrinologist.  He has been on this pump since 2022. Tried Medtronic in past. Did not like the tubing. Denies malfunctioning of the pump. Rates adjusted by last endocrinologist, eliminated pattern of overnight hypoglycemia. Records not available for review. Has had a lot of connectivity/sensor reliability issues with his Freestyle. Has had sensors stop working days early. Got new ones from . Also limited by high costs of diabetic supplies and insulin. No diabetes ed since diagnosis.   Counts his own carbs and does admit difficulty with takeout and restaurant meals    Current Insulin pump settings:  Basal rate: 630a 1.6 u/h,  5pm 1.3 u/h   Insulin to carb ratio:4    Insulin sensitivity factor: 35-40  BG target:    Type of insulin: humalog   Management of glucose reported by patient: CGM  Patient denies any recent episodes of hypoglycemia. Does feel his lows, feels odd during  Feels highs, dry mouth, eyes odd sensation    Discussed with patient in case of malfunctioning of the pump to use basal and bolus therapy as backup which is prescribed to the patient. He has these at home in refrigerator. Also notified patient to call clinic if any issues. Wife knows how to use glucagon. Podiatry: No, UTD on foot exam 2023  Ophthalmology: Needs to see , usually yearly   Nephrology: No, no microalbuminuria    Glucagon: , needs rx  Ketone strips: No      Ranjeet Stinger   Device used : [employer] 3  Home use   Professional Use- Physician Provided Equipment    Indication   Type 1 Diabetes  More than 72 hours of data was reviewed. Report to be scanned to chart. Date Range: 8/3/23    Analysis of data:   % time CGM used: 81%  Average Glucose: 180  Coefficient of Variation: 44.4%    Time in Target Range: 55%   Time Above Range: 41%   Time Below Range: 4%    Interpretation of data: Occasional loss connectivity. Minimal early morning lows. Overall frequent hyperglycemia, often postprandial, with occasional over correctional hypoglycemia. Wide glucose variability. Review of Systems   All other systems reviewed and are negative.       Historical Information   Past Medical History:   Diagnosis Date   • Diabetes type 1, controlled ([address])    • GERD (gastroesophageal reflux disease)      Past Surgical History:   Procedure Laterality Date   • DENTAL SURGERY       Social History   Social History     Substance and Sexual Activity   Alcohol Use Yes   • Alcohol/week: 3.0 standard drinks of alcohol   • Types: 1 Glasses of wine, 1 Cans of beer, 1 Standard drinks or equivalent per week distress. Appearance: Normal appearance. He is obese. He is not ill-appearing, toxic-appearing or diaphoretic. HENT:      Head: Normocephalic and atraumatic. Nose: Nose normal.   Eyes:      Extraocular Movements: Extraocular movements intact. Conjunctiva/sclera: Conjunctivae normal.      Pupils: Pupils are equal, round, and reactive to light. Cardiovascular:      Rate and Rhythm: Normal rate and regular rhythm. Pulmonary:      Effort: No respiratory distress. Breath sounds: Normal breath sounds. No stridor. No wheezing, rhonchi or rales. Abdominal:      General: There is no distension. Musculoskeletal:         General: No deformity. Right lower leg: No edema. Left lower leg: No edema. Skin:     General: Skin is warm and dry. Neurological:      General: No focal deficit present. Mental Status: He is alert. Mental status is at baseline. Psychiatric:         Mood and Affect: Mood normal.         Behavior: Behavior normal.         Thought Content: Thought content normal.         The history was obtained from the review of the chart, patient. Lab Results:   Lab Results   Component Value Date/Time    Hemoglobin A1C 7.9 (A) 08/16/2023 11:02 AM    Hemoglobin A1C 7.8 (H) 04/22/2023 07:21 AM    WBC 7.91 04/22/2023 07:21 AM    Hemoglobin 14.3 04/22/2023 07:21 AM    Hematocrit 43.6 04/22/2023 07:21 AM    MCV 86 04/22/2023 07:21 AM    Platelets 674 43/64/7849 07:21 AM    BUN 18 04/22/2023 07:21 AM    Potassium 4.4 04/22/2023 07:21 AM    Chloride 105 04/22/2023 07:21 AM    CO2 27 04/22/2023 07:21 AM    Creatinine 1.07 04/22/2023 07:21 AM    AST 11 04/22/2023 07:21 AM    ALT 18 04/22/2023 07:21 AM    Total Protein 7.0 04/22/2023 07:21 AM    Albumin 3.5 04/22/2023 07:21 AM    HDL, Direct 55 04/22/2023 07:21 AM    Triglycerides 100 04/22/2023 07:21 AM           Imaging Studies:     Portions of the record may have been created with voice recognition software.  Occasional wrong word or "sound a like" substitutions may have occurred due to the inherent limitations of voice recognition software. Read the chart carefully and recognize, using context, where substitutions have occurred.

## 2023-08-17 ENCOUNTER — TELEPHONE (OUTPATIENT)
Dept: DIABETES SERVICES | Facility: CLINIC | Age: 38
End: 2023-08-17

## 2023-08-17 NOTE — TELEPHONE ENCOUNTER
I called Sarah Barnes for his education referral.  It looks like only the dexcom g6 sensors were ordered. Can you put in an order for the transmitter?  Thank you

## 2023-08-18 DIAGNOSIS — E10.9 TYPE 1 DIABETES MELLITUS WITHOUT COMPLICATION (HCC): Primary | ICD-10-CM

## 2023-08-18 RX ORDER — PROCHLORPERAZINE 25 MG/1
SUPPOSITORY RECTAL
Qty: 1 EACH | Refills: 3 | Status: SHIPPED | OUTPATIENT
Start: 2023-08-18

## 2023-08-21 ENCOUNTER — TELEPHONE (OUTPATIENT)
Dept: ENDOCRINOLOGY | Facility: CLINIC | Age: 38
End: 2023-08-21

## 2023-09-22 DIAGNOSIS — E10.9 TYPE 1 DIABETES MELLITUS WITHOUT COMPLICATION (HCC): Primary | ICD-10-CM

## 2023-09-22 RX ORDER — INSULIN PMP CART,AUT,G6/7,CNTR
1 EACH SUBCUTANEOUS
Qty: 30 EACH | Refills: 1 | Status: SHIPPED | OUTPATIENT
Start: 2023-09-22

## 2023-09-22 RX ORDER — INSULIN PMP CART,AUT,G6/7,CNTR
1 EACH SUBCUTANEOUS DAILY
Qty: 1 KIT | Refills: 0 | Status: SHIPPED | OUTPATIENT
Start: 2023-09-22

## 2023-10-11 ENCOUNTER — TELEPHONE (OUTPATIENT)
Dept: DIABETES SERVICES | Facility: CLINIC | Age: 38
End: 2023-10-11

## 2023-10-11 NOTE — TELEPHONE ENCOUNTER
Received notification from [facility] [place] that patient received OP5.  Clyde to schedule training

## 2023-12-26 DIAGNOSIS — E10.65 TYPE 1 DIABETES MELLITUS WITH HYPERGLYCEMIA (HCC): ICD-10-CM

## 2023-12-26 RX ORDER — INSULIN LISPRO 100 [IU]/ML
INJECTION, SOLUTION INTRAVENOUS; SUBCUTANEOUS
Qty: 20 ML | Refills: 6 | Status: SHIPPED | OUTPATIENT
Start: 2023-12-26

## 2024-01-12 DIAGNOSIS — E78.2 MIXED HYPERLIPIDEMIA: Primary | ICD-10-CM

## 2024-01-12 RX ORDER — ATORVASTATIN CALCIUM 20 MG/1
20 TABLET, FILM COATED ORAL DAILY
Qty: 90 TABLET | Refills: 0 | Status: SHIPPED | OUTPATIENT
Start: 2024-01-12

## 2024-02-14 DIAGNOSIS — K21.9 GASTROESOPHAGEAL REFLUX DISEASE WITHOUT ESOPHAGITIS: ICD-10-CM

## 2024-02-14 RX ORDER — OMEPRAZOLE 20 MG/1
20 CAPSULE, DELAYED RELEASE ORAL DAILY
Qty: 90 CAPSULE | Refills: 1 | Status: SHIPPED | OUTPATIENT
Start: 2024-02-14

## 2024-03-15 DIAGNOSIS — E10.65 TYPE 1 DIABETES MELLITUS WITH HYPERGLYCEMIA (HCC): ICD-10-CM

## 2024-03-15 RX ORDER — METFORMIN HYDROCHLORIDE 500 MG/1
1000 TABLET, EXTENDED RELEASE ORAL 2 TIMES DAILY WITH MEALS
Qty: 360 TABLET | Refills: 1 | Status: SHIPPED | OUTPATIENT
Start: 2024-03-15

## 2024-03-15 NOTE — TELEPHONE ENCOUNTER
Patient needs 90 day supply per insurance.     Reason for call:   [x] Refill   [] Prior Auth  [] Other:     Office:   [] PCP/Provider -   [x] Specialty/Provider - Endo    Medication:     Insulin Lispro (HumaLOG) 100 units/mL vials       metFORMIN (GLUCOPHAGE-XR) 500 mg 24 hr tablet       Dose/Frequency:   Use 200 units every 3 days via insulin pump.     1,000 mg, Oral, 2 times daily with meals     Quantity:   60ml  360    Pharmacy: [facility]/pharmacy #[id number] - [place] - [address]     Does the patient have enough for 3 days?   [] Yes   [x] No - Send as HP to POD

## 2024-03-16 RX ORDER — INSULIN LISPRO 100 [IU]/ML
INJECTION, SOLUTION INTRAVENOUS; SUBCUTANEOUS
Qty: 60 ML | Refills: 1 | Status: SHIPPED | OUTPATIENT
Start: 2024-03-16

## 2024-04-03 DIAGNOSIS — E10.9 TYPE 1 DIABETES MELLITUS WITHOUT COMPLICATION (HCC): ICD-10-CM

## 2024-04-04 RX ORDER — INSULIN PMP CART,AUT,G6/7,CNTR
EACH SUBCUTANEOUS
Qty: 30 EACH | Refills: 1 | Status: SHIPPED | OUTPATIENT
Start: 2024-04-04

## 2024-04-19 DIAGNOSIS — E78.2 MIXED HYPERLIPIDEMIA: ICD-10-CM

## 2024-04-19 RX ORDER — ATORVASTATIN CALCIUM 20 MG/1
20 TABLET, FILM COATED ORAL DAILY
Qty: 90 TABLET | Refills: 1 | Status: SHIPPED | OUTPATIENT
Start: 2024-04-19

## 2024-05-09 ENCOUNTER — VBI (OUTPATIENT)
Dept: ADMINISTRATIVE | Facility: OTHER | Age: 39
End: 2024-05-09

## 2024-05-13 ENCOUNTER — OFFICE VISIT (OUTPATIENT)
Dept: FAMILY MEDICINE CLINIC | Facility: CLINIC | Age: 39
End: 2024-05-13
Payer: COMMERCIAL

## 2024-05-13 VITALS
OXYGEN SATURATION: 98 % | SYSTOLIC BLOOD PRESSURE: 130 MMHG | HEART RATE: 93 BPM | HEIGHT: 66 IN | TEMPERATURE: 98.4 F | WEIGHT: 206.4 LBS | BODY MASS INDEX: 33.17 KG/M2 | RESPIRATION RATE: 17 BRPM | DIASTOLIC BLOOD PRESSURE: 86 MMHG

## 2024-05-13 DIAGNOSIS — G89.29 CHRONIC PAIN OF RIGHT ANKLE: Primary | ICD-10-CM

## 2024-05-13 DIAGNOSIS — E78.2 MIXED HYPERLIPIDEMIA: ICD-10-CM

## 2024-05-13 DIAGNOSIS — E10.65 TYPE 1 DIABETES MELLITUS WITH HYPERGLYCEMIA (HCC): ICD-10-CM

## 2024-05-13 DIAGNOSIS — Z79.899 HIGH RISK MEDICATION USE: ICD-10-CM

## 2024-05-13 DIAGNOSIS — M25.571 CHRONIC PAIN OF RIGHT ANKLE: Primary | ICD-10-CM

## 2024-05-13 PROCEDURE — 99214 OFFICE O/P EST MOD 30 MIN: CPT | Performed by: FAMILY MEDICINE

## 2024-05-13 RX ORDER — INSULIN LISPRO 100 [IU]/ML
INJECTION, SOLUTION INTRAVENOUS; SUBCUTANEOUS
COMMUNITY
Start: 2024-02-21

## 2024-05-13 NOTE — PROGRESS NOTES
Assessment/Plan:       Diagnoses and all orders for this visit:    Chronic pain of right ankle  -     XR ankle 3+ vw right; Future    Type 1 diabetes mellitus with hyperglycemia (HCC)  -     Comprehensive metabolic panel; Future  -     Hemoglobin A1C; Future  -     Albumin / creatinine urine ratio; Future  -     UA (URINE) with reflex to Scope; Future    High risk medication use  -     CBC; Future    Mixed hyperlipidemia  -     Lipid panel; Future    Other orders  -     HumaLOG 100 UNIT/ML injection;  UNITS EVERY 3 DAYS VIA INSULIN PUMP.     Labs ordered  Patient will continue his current medications  X-ray of his right ankle ordered  Patient will need to follow-up in 6 months or sooner if needed  Will follow-up after lab work to go over his results since it has been a long time since he had any blood work done in it is unknown how controlled his diabetes is      Subjective:     Chief Complaint   Patient presents with    Medication Management     Discuss atorvastatin and omeprazole. Needs current blood work - last done 4/22/2023.         Patient ID: Ranjeet Soler is a 38 y.o. male.    Patient presents today for diabetic checkup  He is having pain in his right ankle no other acute complaints today        The following portions of the patient's history were reviewed and updated as appropriate: allergies, current medications, past family history, past medical history, past social history, past surgical history and problem list.    Review of Systems   Constitutional: Negative.    HENT: Negative.     Eyes: Negative.    Respiratory: Negative.     Cardiovascular: Negative.    Gastrointestinal: Negative.    Endocrine: Negative.    Genitourinary: Negative.    Musculoskeletal:  Positive for arthralgias.   Skin: Negative.    Allergic/Immunologic: Negative.    Neurological: Negative.    Hematological: Negative.    Psychiatric/Behavioral: Negative.     All other systems reviewed and are negative.     "    Objective:    Vitals:    05/13/24 1241   BP: 130/86   BP Location: Left arm   Patient Position: Sitting   Cuff Size: Large   Pulse: 93   Resp: 17   Temp: 98.4 °F (36.9 °C)   TempSrc: Tympanic   SpO2: 98%   Weight: 93.6 kg (206 lb 6.4 oz)   Height: 5' 6.2\" (1.681 m)          Physical Exam  Vitals and nursing note reviewed.   Constitutional:       Appearance: Normal appearance.   HENT:      Head: Normocephalic.      Right Ear: Tympanic membrane, ear canal and external ear normal.      Left Ear: Tympanic membrane, ear canal and external ear normal.      Nose: Nose normal.      Mouth/Throat:      Mouth: Mucous membranes are moist.   Eyes:      Conjunctiva/sclera: Conjunctivae normal.      Pupils: Pupils are equal, round, and reactive to light.   Cardiovascular:      Rate and Rhythm: Normal rate and regular rhythm.      Pulses: no weak pulses.           Dorsalis pedis pulses are 2+ on the right side and 2+ on the left side.        Posterior tibial pulses are 2+ on the right side and 2+ on the left side.   Pulmonary:      Effort: Pulmonary effort is normal.      Breath sounds: Normal breath sounds.   Abdominal:      General: Abdomen is flat. Bowel sounds are normal.      Palpations: Abdomen is soft.   Musculoskeletal:         General: Normal range of motion.   Feet:      Right foot:      Skin integrity: No ulcer, skin breakdown, erythema, warmth, callus or dry skin.      Left foot:      Skin integrity: No ulcer, skin breakdown, erythema, warmth, callus or dry skin.   Skin:     General: Skin is warm and dry.   Neurological:      General: No focal deficit present.      Mental Status: He is alert and oriented to person, place, and time. Mental status is at baseline.   Psychiatric:         Mood and Affect: Mood normal.         Behavior: Behavior normal.         Thought Content: Thought content normal.         Judgment: Judgment normal.             Diabetic Foot Exam    Patient's shoes and socks removed.    Right " Foot/Ankle   Right Foot Inspection  Skin Exam: skin normal. Skin not intact, no dry skin, no warmth, no callus, no erythema, no maceration, no abnormal color, no pre-ulcer, no ulcer and no callus.     Toe Exam: ROM and strength within normal limits.     Sensory   Vibration: intact  Proprioception: intact  Monofilament testing: intact    Vascular  Capillary refills: < 3 seconds  The right DP pulse is 2+. The right PT pulse is 2+.     Left Foot/Ankle  Left Foot Inspection  Skin Exam: skin normal. Skin not intact, no dry skin, no warmth, no erythema, no maceration, normal color, no pre-ulcer, no ulcer and no callus.     Toe Exam: ROM and strength within normal limits.     Sensory   Vibration: intact  Proprioception: intact  Monofilament testing: intact    Vascular  Capillary refills: < 3 seconds  The left DP pulse is 2+. The left PT pulse is 2+.     Assign Risk Category  No deformity present  No loss of protective sensation  No weak pulses  Risk: 0

## 2024-07-25 ENCOUNTER — OFFICE VISIT (OUTPATIENT)
Dept: ENDOCRINOLOGY | Facility: CLINIC | Age: 39
End: 2024-07-25
Payer: COMMERCIAL

## 2024-07-25 VITALS
OXYGEN SATURATION: 98 % | BODY MASS INDEX: 32.75 KG/M2 | HEART RATE: 104 BPM | HEIGHT: 66 IN | SYSTOLIC BLOOD PRESSURE: 110 MMHG | DIASTOLIC BLOOD PRESSURE: 70 MMHG | WEIGHT: 203.8 LBS

## 2024-07-25 DIAGNOSIS — E78.2 MIXED HYPERLIPIDEMIA: ICD-10-CM

## 2024-07-25 DIAGNOSIS — E10.9 TYPE 1 DIABETES MELLITUS WITHOUT COMPLICATION (HCC): Primary | ICD-10-CM

## 2024-07-25 LAB — SL AMB POCT HEMOGLOBIN AIC: 7.7 (ref ?–6.5)

## 2024-07-25 PROCEDURE — 83036 HEMOGLOBIN GLYCOSYLATED A1C: CPT

## 2024-07-25 PROCEDURE — 99214 OFFICE O/P EST MOD 30 MIN: CPT

## 2024-07-25 PROCEDURE — 95251 CONT GLUC MNTR ANALYSIS I&R: CPT

## 2024-07-25 NOTE — PROGRESS NOTES
Established Patient Progress Note    CC: Follow up for type 1 diabetes mellitus     Impression & Plan:    Problem List Items Addressed This Visit       Type 1 diabetes mellitus without complication (HCC) - Primary     Poorly controlled  He misses carb entries all together or does not enter them 15 min prior to the meal resulting in hyperglycemia.    We discussed given his pattern of behavior with pump usage, he would benefit from a Medtronic pump which would provide more forgiveness with missed meal boluses.  He would like a Medtronic representative to reach out to him to discuss further.  He is interested in switching but would like more information at this time.    Continue with current regimen for now and work on using pump more efficiently until able to potentially switch to alternative.    Lab Results   Component Value Date    HGBA1C 7.7 (A) 07/25/2024            Relevant Orders    POCT hemoglobin A1c (Completed)    Mixed hyperlipidemia     Update prior to next appointment  - Continue with atorvastatin            Orders Placed This Encounter   Procedures    POCT hemoglobin A1c       History of Present Illness:     Ranjeet Soler is a 39 y.o. male with a history of type 1 diabetes for over 20 years (diagnosed at age 18), GERD, hyperlipidemia. Complications:  none. Last A1C was 7.7. Home glucose monitoring: CGM    He was on Medtronic in the past. He was not happy with it. He did not like the tubing. Reports financial issues with wife losing job and insurance. Therefore was lost to follow up for some time. He would like to get his diabetes under better control. Also reports tubing is difficult for him with his kids    Hypoglycemia: yes- rare  Recent hospitalizations or illnesses: no  Problems with current regimen: yes- missing meal boluses    CGM Interpretation  Ranjeet Soler   Device used Dexcom  Home use   Indication: Type 2 Diabetes  More than 72 hours of data was reviewed. Report to be scanned to chart.   Date  Range: July 12 to July 25, 2024  Analysis of data:   Average Glucose: 188  Coefficient of Variation: 41.4%   SD : 78   Time in Target Range: 46%   Time Above Range: high: 30%; very high: 20%  Time Below Range: low: 3%; very low: 1%  Interpretation of data: Hyperglycemia from missed meal boluses       Patient is on a omnipod 5 pump prescribed by endocrinologist. He denies any malfunctioning of the pump.       Current Insulin pump settings:  Basal rate: 12AM: 1.3 u/hr; 630 AM: 1.6 u/hr; 5PM: 1.3 u/hr  Insulin to carb ratio: 4G  Insulin sensitivity factor: 12AM: 40; 8AM: 35; 8 PM: 36  BG target: 120  Active Insulin time: 3.5 hours     Type of insulin: humalog     Backup Plan: patient aware that in case of malfunctioning of the pump or unexplained hyperglycemia to use basal and bolus therapy as backup which is prescribed to the patient. Also notified patient to call clinic and/or pump company if any issues or go to emergency department if signs/symptoms of DKA.     Has glucagon at home: yes  Has back up basal/bolus regimen at home: yes     Hyperlipidemia: taking atorvastatin    Eye exam: overdue, needs to schedule  Foot exam: [facility], 05/2024    Patient Active Problem List   Diagnosis    Type 1 diabetes mellitus without complication (HCC)    Mixed hyperlipidemia    Obesity (BMI 30-39.9)    Gastroesophageal reflux disease without esophagitis    Type 1 diabetes mellitus with hyperglycemia (HCC)      Past Medical History:   Diagnosis Date    Diabetes type 1, controlled (HCC)     GERD (gastroesophageal reflux disease)       Past Surgical History:   Procedure Laterality Date    DENTAL SURGERY        Family History   Problem Relation Age of Onset    No Known Problems Mother     Cancer Father     No Known Problems Sister     Glaucoma Maternal Grandmother      Social History     Tobacco Use    Smoking status: Former     Types: Cigarettes    Smokeless tobacco: Never    Tobacco comments:     He quit at age 15.     Substance Use  Topics    Alcohol use: Yes     Alcohol/week: 3.0 standard drinks of alcohol     Types: 1 Glasses of wine, 1 Cans of beer, 1 Standard drinks or equivalent per week     Comment: occasional     Allergies   Allergen Reactions    Naproxen Shortness Of Breath         Current Outpatient Medications:     acetone, urine, test strip, Use 1 strip if needed for high blood sugar, Disp: 25 each, Rfl: 5    atorvastatin (LIPITOR) 20 mg tablet, Take 1 tablet (20 mg total) by mouth daily, Disp: 90 tablet, Rfl: 1    Continuous Blood Gluc Sensor (Dexcom G6 Sensor) MISC, Use 1 Units every 10 days, Disp: 9 each, Rfl: 3    Continuous Blood Gluc Transmit (Dexcom G6 Transmitter) MISC, Change every 90 days., Disp: 1 each, Rfl: 3    FREESTYLE LITE test strip, 4 (four) times a day Use as directed, Disp: , Rfl:     Glucagon, rDNA, (Glucagon Emergency) 1 MG KIT, Inject 1 Dose as directed if needed (hypoglycemic emergency), Disp: 1 kit, Rfl: 1    HumaLOG 100 UNIT/ML injection,  UNITS EVERY 3 DAYS VIA INSULIN PUMP., Disp: , Rfl:     Insulin Disposable Pump (Omnipod 5 G6 Intro, Gen 5,) KIT, Use 1 each daily As directed., Disp: 1 kit, Rfl: 0    Insulin Disposable Pump (Omnipod 5 G6 Pods, Gen 5,) MISC, USE 1 EACH EVERY 3 DAYS, Disp: 30 each, Rfl: 1    Insulin Disposable Pump (Omnipod DASH Pods, Gen 4,) MISC, AS DIRECTED VIA PUMP DAILY 90, Disp: , Rfl:     Insulin Lispro (HumaLOG) 100 units/mL cartridge for injection, Use 200 units every 3 days via insulin pump., Disp: 60 mL, Rfl: 1    metFORMIN (GLUCOPHAGE-XR) 500 mg 24 hr tablet, Take 2 tablets (1,000 mg total) by mouth 2 (two) times a day with meals Start 500mg once a day with dinner , after 1 week go to 500mg BID, after 1 week go to 1000mg BID as tolerated, Disp: 360 tablet, Rfl: 1    omeprazole (PriLOSEC) 20 mg delayed release capsule, TAKE 1 CAPSULE BY MOUTH EVERY DAY, Disp: 90 capsule, Rfl: 1    Continuous Blood Gluc Sensor (FreeStyle Rock 2 Sensor) MISC, CHANGE SENSOR EVERY 14 DAYS AS  "DIRECTED (Patient not taking: Reported on 5/13/2024), Disp: , Rfl:     Review of Systems   Constitutional:  Negative for chills and fever.   HENT:  Negative for ear pain and sore throat.    Eyes:  Negative for pain and visual disturbance.   Respiratory:  Negative for cough and shortness of breath.    Cardiovascular:  Negative for chest pain and palpitations.   Gastrointestinal:  Negative for abdominal pain and vomiting.   Genitourinary:  Negative for dysuria and hematuria.   Musculoskeletal:  Negative for arthralgias and back pain.   Skin:  Negative for color change and rash.   Neurological:  Negative for seizures and syncope.   All other systems reviewed and are negative.      Physical Exam:  Body mass index is 32.89 kg/m².  /70   Pulse 104   Ht 5' 6\" (1.676 m)   Wt 92.4 kg (203 lb 12.8 oz)   SpO2 98%   BMI 32.89 kg/m²    Wt Readings from Last 3 Encounters:   07/25/24 92.4 kg (203 lb 12.8 oz)   05/13/24 93.6 kg (206 lb 6.4 oz)   08/16/23 94.6 kg (208 lb 9.6 oz)       Physical Exam  Vitals reviewed.   Constitutional:       Appearance: Normal appearance.   HENT:      Head: Normocephalic and atraumatic.   Cardiovascular:      Rate and Rhythm: Tachycardia present.   Pulmonary:      Effort: Pulmonary effort is normal.   Neurological:      Mental Status: He is alert and oriented to person, place, and time.   Psychiatric:         Mood and Affect: Mood normal.         Behavior: Behavior normal.       Diabetic Foot Exam    Labs:   Lab Results   Component Value Date    HGBA1C 7.7 (A) 07/25/2024    HGBA1C 7.9 (A) 08/16/2023    HGBA1C 7.8 (H) 04/22/2023     Lab Results   Component Value Date    CREATININE 1.07 04/22/2023    BUN 18 04/22/2023    K 4.4 04/22/2023     04/22/2023    CO2 27 04/22/2023     eGFR   Date Value Ref Range Status   04/22/2023 87 ml/min/1.73sq m Final     Lab Results   Component Value Date    HDL 55 04/22/2023    TRIG 100 04/22/2023     Lab Results   Component Value Date    ALT 18 " "04/22/2023    AST 11 04/22/2023    ALKPHOS 107 04/22/2023     Lab Results   Component Value Date    DSV0JXRGNICU 1.890 04/22/2023     No results found for: \"FREET4\", \"TSI\"      Patient Instructions   Medtronic 780 G          Discussed with the patient and all questioned fully answered. He will call me if any problems arise.        "

## 2024-07-25 NOTE — ASSESSMENT & PLAN NOTE
Poorly controlled  He misses carb entries all together or does not enter them 15 min prior to the meal resulting in hyperglycemia.    We discussed given his pattern of behavior with pump usage, he would benefit from a Medtronic pump which would provide more forgiveness with missed meal boluses.  He would like a Medtronic representative to reach out to him to discuss further.  He is interested in switching but would like more information at this time.    Continue with current regimen for now and work on using pump more efficiently until able to potentially switch to alternative.    Lab Results   Component Value Date    HGBA1C 7.7 (A) 07/25/2024

## 2024-08-02 ENCOUNTER — TELEPHONE (OUTPATIENT)
Dept: ENDOCRINOLOGY | Facility: CLINIC | Age: 39
End: 2024-08-02

## 2024-08-02 DIAGNOSIS — E10.65 TYPE 1 DIABETES MELLITUS WITH HYPERGLYCEMIA (HCC): ICD-10-CM

## 2024-08-02 DIAGNOSIS — E10.65 TYPE 1 DIABETES MELLITUS WITH HYPERGLYCEMIA (HCC): Primary | ICD-10-CM

## 2024-08-02 RX ORDER — PROCHLORPERAZINE 25 MG/1
1 SUPPOSITORY RECTAL
Qty: 9 EACH | Refills: 1 | Status: SHIPPED | OUTPATIENT
Start: 2024-08-02

## 2024-08-02 RX ORDER — INSULIN DEGLUDEC 100 U/ML
25 INJECTION, SOLUTION SUBCUTANEOUS DAILY
Qty: 15 ML | Refills: 2 | Status: SHIPPED | OUTPATIENT
Start: 2024-08-02

## 2024-08-02 NOTE — TELEPHONE ENCOUNTER
Patient states he is looking for a long term acting pen. He has a quick pen. Forgot to mention at his appointment.    4 = No assist / stand by assistance

## 2024-08-05 NOTE — TELEPHONE ENCOUNTER
Patient returning call and made aware:    CANDE Montiel   to [facility]   8/2/24 11:38 AM  Let patient know I prescribed backup basal for him in case of pump failure which is Tresiba 25 units daily.  I calculated this based off of his weight (18 units) and his total daily dose from his OmniPod (33 units daily).  If he were to ever use this and in need for tighter control, please have him let us know    Thank you      Patient verbalized udnerstanding

## 2024-08-16 DIAGNOSIS — K21.9 GASTROESOPHAGEAL REFLUX DISEASE WITHOUT ESOPHAGITIS: ICD-10-CM

## 2024-09-04 DIAGNOSIS — E10.65 TYPE 1 DIABETES MELLITUS WITH HYPERGLYCEMIA (HCC): Primary | ICD-10-CM

## 2024-09-04 RX ORDER — INSULIN LISPRO 100 [IU]/ML
INJECTION, SOLUTION INTRAVENOUS; SUBCUTANEOUS
Qty: 15 ML | Refills: 2 | Status: SHIPPED | OUTPATIENT
Start: 2024-09-04

## 2024-09-04 NOTE — TELEPHONE ENCOUNTER
Reason for call:   [x] Refill   [] Prior Auth  [] Other:     Office:   [] PCP/Provider -   [x] Specialty/Provider - Endo    Medication:     Does the patient have enough for 3 days?   [] Yes   [x] No - Send as HP to POD

## 2024-09-05 NOTE — TELEPHONE ENCOUNTER
Patient called the Rx Refill Line stating that his Humalog has to be written for a 90 day supply otherwise the pharmacy will not fill it. Please advise

## 2024-09-12 DIAGNOSIS — E10.65 TYPE 1 DIABETES MELLITUS WITH HYPERGLYCEMIA (HCC): ICD-10-CM

## 2024-09-17 ENCOUNTER — TELEPHONE (OUTPATIENT)
Age: 39
End: 2024-09-17

## 2024-09-17 NOTE — TELEPHONE ENCOUNTER
Pt states he was in urgent care on 9/15 for a tooth infection. Pt received antibiotic but now his face on the left side is swollen and he had a fever this morning of 101.7. No avail appt until 9/23. Please advise

## 2024-09-17 NOTE — TELEPHONE ENCOUNTER
Left message asking how Ranjeet is feeling and stated that if he is still having his left side swollen face and temp to go to urgent care or er. Left message also for patient to reach out if they need anything else

## 2024-09-17 NOTE — TELEPHONE ENCOUNTER
[facility] Office     Per provider request    Please have patient update lab work (CMP) he has not had this is over 1 year, before any more refill of metformin can be given. Thank you

## 2024-09-18 ENCOUNTER — TELEPHONE (OUTPATIENT)
Dept: ENDOCRINOLOGY | Facility: CLINIC | Age: 39
End: 2024-09-18

## 2024-09-18 DIAGNOSIS — E10.65 TYPE 1 DIABETES MELLITUS WITH HYPERGLYCEMIA (HCC): Primary | ICD-10-CM

## 2024-09-18 NOTE — TELEPHONE ENCOUNTER
Spoke to PT about needing the CMP for the refill per provider he said will get it done and check with his insurance to see where is cheaper for him

## 2024-09-20 ENCOUNTER — TELEPHONE (OUTPATIENT)
Dept: ENDOCRINOLOGY | Facility: CLINIC | Age: 39
End: 2024-09-20

## 2024-09-20 RX ORDER — METFORMIN HCL 500 MG
TABLET, EXTENDED RELEASE 24 HR ORAL
Qty: 30 TABLET | Refills: 0 | Status: SHIPPED | OUTPATIENT
Start: 2024-09-20

## 2024-09-24 DIAGNOSIS — E10.9 TYPE 1 DIABETES MELLITUS WITHOUT COMPLICATION (HCC): ICD-10-CM

## 2024-09-25 RX ORDER — PROCHLORPERAZINE 25 MG/1
SUPPOSITORY RECTAL
Qty: 1 EACH | Refills: 1 | Status: SHIPPED | OUTPATIENT
Start: 2024-09-25

## 2024-09-25 RX ORDER — INSULIN PMP CART,AUT,G6/7,CNTR
EACH SUBCUTANEOUS
Qty: 30 EACH | Refills: 1 | Status: SHIPPED | OUTPATIENT
Start: 2024-09-25

## 2024-09-29 LAB
ALBUMIN SERPL-MCNC: 4.2 G/DL (ref 4.1–5.1)
ALBUMIN/CREAT UR: 3 MG/G CREAT (ref 0–29)
ALP SERPL-CCNC: 77 IU/L (ref 44–121)
ALT SERPL-CCNC: 13 IU/L (ref 0–44)
APPEARANCE UR: ABNORMAL
AST SERPL-CCNC: 13 IU/L (ref 0–40)
BASOPHILS # BLD AUTO: 0.1 X10E3/UL (ref 0–0.2)
BASOPHILS NFR BLD AUTO: 1 %
BILIRUB SERPL-MCNC: 0.6 MG/DL (ref 0–1.2)
BILIRUB UR QL STRIP: NEGATIVE
BUN SERPL-MCNC: 16 MG/DL (ref 6–24)
BUN/CREAT SERPL: 20 (ref 9–20)
CALCIUM SERPL-MCNC: 9 MG/DL (ref 8.7–10.2)
CHLORIDE SERPL-SCNC: 99 MMOL/L (ref 96–106)
CHOLEST SERPL-MCNC: 133 MG/DL (ref 100–199)
CO2 SERPL-SCNC: 23 MMOL/L (ref 20–29)
COLOR UR: YELLOW
CREAT SERPL-MCNC: 0.82 MG/DL (ref 0.76–1.27)
CREAT UR-MCNC: 216.8 MG/DL
EGFR: 114 ML/MIN/1.73
EOSINOPHIL # BLD AUTO: 0.4 X10E3/UL (ref 0–0.4)
EOSINOPHIL NFR BLD AUTO: 6 %
ERYTHROCYTE [DISTWIDTH] IN BLOOD BY AUTOMATED COUNT: 12.2 % (ref 11.6–15.4)
GLOBULIN SER-MCNC: 2.6 G/DL (ref 1.5–4.5)
GLUCOSE SERPL-MCNC: 218 MG/DL (ref 70–99)
GLUCOSE UR QL: ABNORMAL
HBA1C MFR BLD: 7.6 % (ref 4.8–5.6)
HCT VFR BLD AUTO: 43.9 % (ref 37.5–51)
HDLC SERPL-MCNC: 42 MG/DL
HGB BLD-MCNC: 14.4 G/DL (ref 13–17.7)
HGB UR QL STRIP: NEGATIVE
IMM GRANULOCYTES # BLD: 0 X10E3/UL (ref 0–0.1)
IMM GRANULOCYTES NFR BLD: 0 %
KETONES UR QL STRIP: ABNORMAL
LDLC SERPL CALC-MCNC: 76 MG/DL (ref 0–99)
LEUKOCYTE ESTERASE UR QL STRIP: NEGATIVE
LYMPHOCYTES # BLD AUTO: 2 X10E3/UL (ref 0.7–3.1)
LYMPHOCYTES NFR BLD AUTO: 28 %
MCH RBC QN AUTO: 28.6 PG (ref 26.6–33)
MCHC RBC AUTO-ENTMCNC: 32.8 G/DL (ref 31.5–35.7)
MCV RBC AUTO: 87 FL (ref 79–97)
MICRO URNS: ABNORMAL
MICROALBUMIN UR-MCNC: 6.2 UG/ML
MONOCYTES # BLD AUTO: 0.5 X10E3/UL (ref 0.1–0.9)
MONOCYTES NFR BLD AUTO: 7 %
NEUTROPHILS # BLD AUTO: 4.1 X10E3/UL (ref 1.4–7)
NEUTROPHILS NFR BLD AUTO: 58 %
NITRITE UR QL STRIP: NEGATIVE
PH UR STRIP: 5.5 [PH] (ref 5–7.5)
PLATELET # BLD AUTO: 449 X10E3/UL (ref 150–450)
POTASSIUM SERPL-SCNC: 4.6 MMOL/L (ref 3.5–5.2)
PROT SERPL-MCNC: 6.8 G/DL (ref 6–8.5)
PROT UR QL STRIP: ABNORMAL
RBC # BLD AUTO: 5.03 X10E6/UL (ref 4.14–5.8)
SL AMB VLDL CHOLESTEROL CALC: 15 MG/DL (ref 5–40)
SODIUM SERPL-SCNC: 136 MMOL/L (ref 134–144)
SP GR UR: >=1.03 (ref 1–1.03)
TRIGL SERPL-MCNC: 74 MG/DL (ref 0–149)
UROBILINOGEN UR STRIP-ACNC: 1 MG/DL (ref 0.2–1)
WBC # BLD AUTO: 7.2 X10E3/UL (ref 3.4–10.8)

## 2024-10-01 DIAGNOSIS — E10.65 TYPE 1 DIABETES MELLITUS WITH HYPERGLYCEMIA (HCC): ICD-10-CM

## 2024-10-01 NOTE — TELEPHONE ENCOUNTER
Patient would like for this to be changed to a 90 day supply. He is currently out and the pharmacy will not let him fill it for another week. Please contact the pharmacy to approve an early fill.     Reason for call:   [x] Refill   [] Prior Auth  [] Other:     Office:   [] PCP/Provider -   [x] Specialty/Provider - Endocrinology     Medication: HumaLOG    Dose/Frequency: 100 unit/mL injection. Use 200 units every 3 days via pump    Quantity: 45 mL    Pharmacy: [facility]/pharmacy #[id number] - [place] - [address], [address] [phone]     Does the patient have enough for 3 days?   [] Yes   [x] No - Send as HP to POD

## 2024-10-03 RX ORDER — INSULIN LISPRO 100 [IU]/ML
INJECTION, SOLUTION INTRAVENOUS; SUBCUTANEOUS
Qty: 45 ML | Refills: 1 | Status: SHIPPED | OUTPATIENT
Start: 2024-10-03 | End: 2024-10-09 | Stop reason: SDUPTHER

## 2024-10-07 ENCOUNTER — TELEPHONE (OUTPATIENT)
Age: 39
End: 2024-10-07

## 2024-10-07 NOTE — TELEPHONE ENCOUNTER
Patient called in stating that his prescription for Humalog has been written wrong multiple times and due to this he is being charged more for the medication. He states that it's supposed to be 90 DAY SUPPLY FOR 6 BOTTLES. Please make the correction so that pt can receive medication. Thank you

## 2024-10-09 DIAGNOSIS — E10.65 TYPE 1 DIABETES MELLITUS WITH HYPERGLYCEMIA (HCC): ICD-10-CM

## 2024-10-09 RX ORDER — INSULIN LISPRO 100 [IU]/ML
INJECTION, SOLUTION INTRAVENOUS; SUBCUTANEOUS
Qty: 45 ML | Refills: 1 | Status: SHIPPED | OUTPATIENT
Start: 2024-10-09

## 2024-10-09 NOTE — TELEPHONE ENCOUNTER
Patient called the RX Refill Line. Message is being forwarded to the office.     Patient is requesting Humalog vials not the cartridge or pen!    Please contact patient at [phone]

## 2024-10-18 DIAGNOSIS — E78.2 MIXED HYPERLIPIDEMIA: ICD-10-CM

## 2024-10-18 RX ORDER — ATORVASTATIN CALCIUM 20 MG/1
20 TABLET, FILM COATED ORAL DAILY
Qty: 90 TABLET | Refills: 1 | Status: SHIPPED | OUTPATIENT
Start: 2024-10-18

## 2024-10-19 DIAGNOSIS — E10.65 TYPE 1 DIABETES MELLITUS WITH HYPERGLYCEMIA (HCC): ICD-10-CM

## 2024-10-21 RX ORDER — METFORMIN HYDROCHLORIDE 500 MG/1
TABLET, EXTENDED RELEASE ORAL
Qty: 30 TABLET | Refills: 5 | Status: SHIPPED | OUTPATIENT
Start: 2024-10-21

## 2024-11-01 DIAGNOSIS — E10.65 TYPE 1 DIABETES MELLITUS WITH HYPERGLYCEMIA (HCC): ICD-10-CM

## 2024-11-01 RX ORDER — METFORMIN HYDROCHLORIDE 500 MG/1
TABLET, EXTENDED RELEASE ORAL
Qty: 30 TABLET | Refills: 5 | Status: SHIPPED | OUTPATIENT
Start: 2024-11-01 | End: 2024-11-01

## 2024-11-01 RX ORDER — METFORMIN HYDROCHLORIDE 500 MG/1
1000 TABLET, EXTENDED RELEASE ORAL 2 TIMES DAILY WITH MEALS
Qty: 120 TABLET | Refills: 2 | Status: SHIPPED | OUTPATIENT
Start: 2024-11-01

## 2024-11-15 ENCOUNTER — OFFICE VISIT (OUTPATIENT)
Dept: FAMILY MEDICINE CLINIC | Facility: CLINIC | Age: 39
End: 2024-11-15
Payer: COMMERCIAL

## 2024-11-15 VITALS
WEIGHT: 200.4 LBS | SYSTOLIC BLOOD PRESSURE: 110 MMHG | OXYGEN SATURATION: 99 % | RESPIRATION RATE: 18 BRPM | HEIGHT: 66 IN | DIASTOLIC BLOOD PRESSURE: 70 MMHG | TEMPERATURE: 98.7 F | HEART RATE: 115 BPM | BODY MASS INDEX: 32.21 KG/M2

## 2024-11-15 DIAGNOSIS — Z00.00 ENCOUNTER FOR WELL ADULT EXAM WITHOUT ABNORMAL FINDINGS: Primary | ICD-10-CM

## 2024-11-15 DIAGNOSIS — J34.89 MAXILLARY SINUS MASS: ICD-10-CM

## 2024-11-15 DIAGNOSIS — E10.65 TYPE 1 DIABETES MELLITUS WITH HYPERGLYCEMIA (HCC): ICD-10-CM

## 2024-11-15 PROCEDURE — 99396 PREV VISIT EST AGE 40-64: CPT | Performed by: FAMILY MEDICINE

## 2024-11-15 RX ORDER — CHLORHEXIDINE GLUCONATE ORAL RINSE 1.2 MG/ML
SOLUTION DENTAL
COMMUNITY
Start: 2024-11-14

## 2024-11-15 RX ORDER — AMOXICILLIN 875 MG/1
TABLET, COATED ORAL
COMMUNITY
Start: 2024-10-21

## 2024-11-15 NOTE — PROGRESS NOTES
"name: Ranjeet Soler      : 1985      MRN: [id number]  Encounter Provider: Emiliano Calero DO  Encounter Date: 11/15/2024   Encounter department: [facility]  :  Assessment & Plan  Encounter for well adult exam without abnormal findings         Type 1 diabetes mellitus with hyperglycemia (HCC)    Lab Results   Component Value Date    HGBA1C 7.6 (H) 2024   Patient's A1c is uncontrolled he needs a follow-up with endocrinology         Maxillary sinus mass  CT ordered to evaluate his sinus mass on the left side  Orders:    CT sinus wo contrast; Future      Patient to follow-up in 6 months or sooner if needed depending upon the CT results    Depression Screening and Follow-up Plan: Patient was screened for depression during today's encounter. They screened negative with a PHQ-2 score of 0.      History of Present Illness     Patient presents today for 6 med check  Overall he is doing well with no acute complaints  Reviewed his blood work his sugar is 7.6   Patient was recently seen by the dentist and on dental x-rays was told he had a mass in his sinus that he needs evaluated      Review of Systems   Constitutional: Negative.    HENT: Negative.     Eyes: Negative.    Respiratory: Negative.     Cardiovascular: Negative.    Gastrointestinal: Negative.    Endocrine: Negative.    Genitourinary: Negative.    Musculoskeletal: Negative.    Skin: Negative.    Allergic/Immunologic: Negative.    Neurological: Negative.    Hematological: Negative.    Psychiatric/Behavioral: Negative.     All other systems reviewed and are negative.         Objective   /70 (BP Location: Left arm, Patient Position: Sitting, Cuff Size: Large)   Pulse (!) 115   Temp 98.7 °F (37.1 °C) (Tympanic)   Resp 18   Ht 5' 6.2\" (1.681 m)   Wt 90.9 kg (200 lb 6.4 oz)   SpO2 99%   BMI 32.15 kg/m²      Physical Exam  Vitals and nursing note reviewed.   Constitutional:       Appearance: Normal appearance. He is " well-developed.   HENT:      Head: Normocephalic.      Right Ear: External ear normal.      Left Ear: External ear normal.      Nose: Nose normal.   Eyes:      Conjunctiva/sclera: Conjunctivae normal.      Pupils: Pupils are equal, round, and reactive to light.   Cardiovascular:      Rate and Rhythm: Normal rate and regular rhythm.      Heart sounds: Normal heart sounds.   Pulmonary:      Effort: Pulmonary effort is normal.      Breath sounds: Normal breath sounds.   Abdominal:      General: Bowel sounds are normal.      Palpations: Abdomen is soft.   Musculoskeletal:         General: Normal range of motion.      Cervical back: Normal range of motion and neck supple.   Skin:     General: Skin is warm and dry.   Neurological:      General: No focal deficit present.      Mental Status: He is alert and oriented to person, place, and time.   Psychiatric:         Behavior: Behavior normal.         Thought Content: Thought content normal.         Judgment: Judgment normal.

## 2024-11-15 NOTE — ASSESSMENT & PLAN NOTE
Lab Results   Component Value Date    HGBA1C 7.6 (H) 09/28/2024   Patient's A1c is uncontrolled he needs a follow-up with endocrinology

## 2024-11-29 ENCOUNTER — HOSPITAL ENCOUNTER (OUTPATIENT)
Dept: CT IMAGING | Facility: HOSPITAL | Age: 39
Discharge: HOME | End: 2024-11-29
Payer: COMMERCIAL

## 2024-11-29 DIAGNOSIS — J34.89 MAXILLARY SINUS MASS: ICD-10-CM

## 2024-11-29 PROCEDURE — 70486 CT MAXILLOFACIAL W/O DYE: CPT

## 2024-12-02 ENCOUNTER — RESULTS FOLLOW-UP (OUTPATIENT)
Dept: FAMILY MEDICINE CLINIC | Facility: CLINIC | Age: 39
End: 2024-12-02

## 2024-12-24 ENCOUNTER — OFFICE VISIT (OUTPATIENT)
Dept: ENDOCRINOLOGY | Facility: CLINIC | Age: 39
End: 2024-12-24
Payer: COMMERCIAL

## 2024-12-24 VITALS
HEART RATE: 104 BPM | SYSTOLIC BLOOD PRESSURE: 120 MMHG | WEIGHT: 194.2 LBS | BODY MASS INDEX: 31.21 KG/M2 | DIASTOLIC BLOOD PRESSURE: 70 MMHG | HEIGHT: 66 IN

## 2024-12-24 DIAGNOSIS — E10.9 TYPE 1 DIABETES MELLITUS WITHOUT COMPLICATION (HCC): Primary | ICD-10-CM

## 2024-12-24 DIAGNOSIS — E78.2 MIXED HYPERLIPIDEMIA: ICD-10-CM

## 2024-12-24 PROCEDURE — 95251 CONT GLUC MNTR ANALYSIS I&R: CPT

## 2024-12-24 PROCEDURE — 99214 OFFICE O/P EST MOD 30 MIN: CPT

## 2024-12-24 NOTE — PROGRESS NOTES
Established Patient Progress Note    Chief Complaint   Patient presents with    Diabetes Type 1       Impression & Plan:    Assessment & Plan  Type 1 diabetes mellitus without complication (HCC)  Big improvement from prior with pump usage  Set timer on phone to remember to take metformin at nighttime  Some periods of missed boluses. Reduce AIT to 2.5 hours to help reduce hyperglycemia associated with this   Continue current regimen    Lab Results   Component Value Date    HGBA1C 7.6 (H) 09/28/2024       Orders:    Hemoglobin A1C; Future    Comprehensive metabolic panel; Future    Hemoglobin A1C; Future    Comprehensive metabolic panel; Future    Lipid panel; Future    Mixed hyperlipidemia  Update prior to next appointment  Continue statin          Orders Placed This Encounter   Procedures    Hemoglobin A1C     Standing Status:   Future     Expected Date:   3/24/2025     Expiration Date:   12/24/2025    Comprehensive metabolic panel     This is a patient instruction: Patient fasting for 8 hours or longer recommended.     Standing Status:   Future     Expected Date:   3/24/2025     Expiration Date:   12/24/2025    Hemoglobin A1C     Standing Status:   Future     Expected Date:   3/24/2025     Expiration Date:   12/24/2025    Comprehensive metabolic panel     This is a patient instruction: Patient fasting for 8 hours or longer recommended.     Standing Status:   Future     Expected Date:   4/24/2025     Expiration Date:   12/24/2025    Lipid panel     This is a patient instruction: This test requires patient fasting for 10-12 hours or longer. Drinking of black coffee or black tea is acceptable.     Standing Status:   Future     Expected Date:   3/24/2025     Expiration Date:   12/24/2025       History of Present Illness:     Ranjeet Soler is a 39 y.o. male with a history of type 1 diabetes for over 20 years (diagnosed at age 18), GERD, hyperlipidemia. Complications:  none. Last A1C was 7.6. Home glucose monitoring:  CGM     Has been exercising, playing with kids  Doing his best with diet     Hypoglycemia: yes- rare. Improved from prior  Recent hospitalizations or illnesses: no  Problems with current regimen: yes- missing meal boluses but has improved from prior. Sometimes misses PM dose of metformin     CGM Interpretation  Ranjeet Karlene   Device used Dexcom  Home use   Indication: Type 2 Diabetes  More than 72 hours of data was reviewed. Report to be scanned to chart.   Date Range: Dec 10 to Dec 23, 2024  Analysis of data:   Average Glucose: 161  Coefficient of Variation: 31.8%   SD : 51  Time in Target Range: 68%   Time Above Range: high: 22%; very high: 8%  Time Below Range: low: 2%; very low: 0%  Interpretation of data: Some hyperglycemia from missed meal boluses, otherwise doing better. Less hypoglycemia than prior     Patient is on a omnipod 5 pump prescribed by endocrinologist. He denies any malfunctioning of the pump.       Current Insulin pump settings: uploaded into media     Hyperlipidemia: taking atorvastatin     Eye exam: UTD, no DR per patient  Foot exam: UTD, 05/2024       Patient Active Problem List   Diagnosis    Type 1 diabetes mellitus without complication (HCC)    Mixed hyperlipidemia    Obesity (BMI 30-39.9)    Gastroesophageal reflux disease without esophagitis    Type 1 diabetes mellitus with hyperglycemia (HCC)      Past Medical History:   Diagnosis Date    Diabetes type 1, controlled (HCC)     GERD (gastroesophageal reflux disease)       Past Surgical History:   Procedure Laterality Date    DENTAL SURGERY        Family History   Problem Relation Age of Onset    No Known Problems Mother     Cancer Father     No Known Problems Sister     Glaucoma Maternal Grandmother      Social History     Tobacco Use    Smoking status: Former     Types: Cigarettes     Passive exposure: Past    Smokeless tobacco: Never    Tobacco comments:     He quit at age 15.     Substance Use Topics    Alcohol use: Yes      Alcohol/week: 2.0 standard drinks of alcohol     Types: 1 Cans of beer, 1 Standard drinks or equivalent per week     Comment: occasional     Allergies   Allergen Reactions    Naproxen Shortness Of Breath         Current Outpatient Medications:     acetone, urine, test strip, Use 1 strip if needed for high blood sugar, Disp: 25 each, Rfl: 5    atorvastatin (LIPITOR) 20 mg tablet, TAKE 1 TABLET BY MOUTH EVERY DAY, Disp: 90 tablet, Rfl: 1    Continuous Glucose Sensor (Dexcom G6 Sensor) MISC, Use 1 Units every 10 days, Disp: 9 each, Rfl: 1    Continuous Glucose Transmitter (Dexcom G6 Transmitter) MISC, CHANGE EVERY 90 DAYS., Disp: 1 each, Rfl: 1    FREESTYLE LITE test strip, 4 (four) times a day Use as directed, Disp: , Rfl:     Glucagon, rDNA, (Glucagon Emergency) 1 MG KIT, Inject 1 Dose as directed if needed (hypoglycemic emergency), Disp: 1 kit, Rfl: 1    HumaLOG 100 UNIT/ML injection, Use 200 units every 3 days via pump, Disp: 45 mL, Rfl: 1    insulin degludec (Tresiba FlexTouch) 100 units/mL injection pen, Inject 25 Units under the skin daily, Disp: 15 mL, Rfl: 2    Insulin Disposable Pump (Omnipod 5 G6 Intro, Gen 5,) KIT, Use 1 each daily As directed., Disp: 1 kit, Rfl: 0    Insulin Disposable Pump (Omnipod 5 G6 Pods, Gen 5,) MISC, USE 1 EACH EVERY 3 DAYS, Disp: 30 each, Rfl: 1    metFORMIN (GLUCOPHAGE-XR) 500 mg 24 hr tablet, Take 2 tablets (1,000 mg total) by mouth 2 (two) times a day with meals, Disp: 120 tablet, Rfl: 2    omeprazole (PriLOSEC) 20 mg delayed release capsule, TAKE 1 CAPSULE BY MOUTH EVERY DAY, Disp: 90 capsule, Rfl: 1    amoxicillin (AMOXIL) 875 mg tablet, TAKE 1 TABLET 2 TIMES A DAY UNTIL COMPLETE (Patient not taking: Reported on 11/15/2024), Disp: , Rfl:     amoxicillin-clavulanate (AUGMENTIN) 875-125 mg per tablet, Take 1 tablet by mouth every 12 (twelve) hours, Disp: , Rfl:     chlorhexidine (PERIDEX) 0.12 % solution, , Disp: , Rfl:     Continuous Blood Gluc Sensor (FreeStyle Rock 2 Sensor)  "MISC, CHANGE SENSOR EVERY 14 DAYS AS DIRECTED (Patient not taking: Reported on 5/13/2024), Disp: , Rfl:     Insulin Disposable Pump (Omnipod DASH Pods, Gen 4,) MISC, AS DIRECTED VIA PUMP DAILY 90, Disp: , Rfl:     Insulin Lispro (HumaLOG) 100 units/mL cartridge for injection, Use 200 units every 3 days via insulin pump., Disp: 60 mL, Rfl: 1    Review of Systems   Constitutional:  Negative for chills and fever.   HENT:  Negative for ear pain and sore throat.    Eyes:  Negative for pain and visual disturbance.   Respiratory:  Negative for cough and shortness of breath.    Cardiovascular:  Negative for chest pain and palpitations.   Gastrointestinal:  Negative for abdominal pain and vomiting.   Genitourinary:  Negative for dysuria and hematuria.   Musculoskeletal:  Negative for arthralgias and back pain.   Skin:  Negative for color change and rash.   Neurological:  Negative for seizures and syncope.   All other systems reviewed and are negative.      Physical Exam:  Body mass index is 31.34 kg/m².  /70 (BP Location: Left arm, Patient Position: Sitting, Cuff Size: Adult)   Pulse 104   Ht 5' 6\" (1.676 m)   Wt 88.1 kg (194 lb 3.2 oz)   BMI 31.34 kg/m²    Wt Readings from Last 3 Encounters:   12/24/24 88.1 kg (194 lb 3.2 oz)   11/15/24 90.9 kg (200 lb 6.4 oz)   07/25/24 92.4 kg (203 lb 12.8 oz)       Physical Exam  Vitals reviewed.   Constitutional:       Appearance: Normal appearance.   HENT:      Head: Normocephalic and atraumatic.   Cardiovascular:      Rate and Rhythm: Tachycardia present.   Pulmonary:      Effort: Pulmonary effort is normal.   Neurological:      Mental Status: He is alert and oriented to person, place, and time.   Psychiatric:         Mood and Affect: Mood normal.         Behavior: Behavior normal.         Labs:   Lab Results   Component Value Date    HGBA1C 7.6 (H) 09/28/2024    HGBA1C 7.7 (A) 07/25/2024    HGBA1C 7.9 (A) 08/16/2023     Lab Results   Component Value Date    CREATININE 0.82 " "09/28/2024    CREATININE 1.07 04/22/2023    BUN 16 09/28/2024    K 4.6 09/28/2024    CL 99 09/28/2024    CO2 23 09/28/2024     eGFR   Date Value Ref Range Status   09/28/2024 114 >59 mL/min/1.73 Final   04/22/2023 87 ml/min/1.73sq m Final     Lab Results   Component Value Date    HDL 42 09/28/2024    TRIG 74 09/28/2024     Lab Results   Component Value Date    ALT 13 09/28/2024    AST 13 09/28/2024    ALKPHOS 107 04/22/2023     Lab Results   Component Value Date    [id number] 1.890 04/22/2023     No results found for: \"FREET4\", \"TSI\"      There are no Patient Instructions on file for this visit.      Discussed with the patient and all questioned fully answered. He will call me if any problems arise.        "

## 2024-12-24 NOTE — ASSESSMENT & PLAN NOTE
Big improvement from prior with pump usage  Set timer on phone to remember to take metformin at nighttime  Some periods of missed boluses. Reduce AIT to 2.5 hours to help reduce hyperglycemia associated with this   Continue current regimen    Lab Results   Component Value Date    HGBA1C 7.6 (H) 09/28/2024       Orders:    Hemoglobin A1C; Future    Comprehensive metabolic panel; Future    Hemoglobin A1C; Future    Comprehensive metabolic panel; Future    Lipid panel; Future    
Update prior to next appointment  Continue statin        
0 = understands/communicates without difficulty

## 2025-01-02 DIAGNOSIS — E10.65 TYPE 1 DIABETES MELLITUS WITH HYPERGLYCEMIA (HCC): ICD-10-CM

## 2025-01-03 RX ORDER — METFORMIN HYDROCHLORIDE 500 MG/1
1000 TABLET, EXTENDED RELEASE ORAL 2 TIMES DAILY WITH MEALS
Qty: 360 TABLET | Refills: 1 | Status: SHIPPED | OUTPATIENT
Start: 2025-01-03

## 2025-01-21 DIAGNOSIS — E78.2 MIXED HYPERLIPIDEMIA: ICD-10-CM

## 2025-01-22 RX ORDER — ATORVASTATIN CALCIUM 20 MG/1
20 TABLET, FILM COATED ORAL DAILY
Qty: 90 TABLET | Refills: 1 | Status: SHIPPED | OUTPATIENT
Start: 2025-01-22

## 2025-01-29 DIAGNOSIS — E10.65 TYPE 1 DIABETES MELLITUS WITH HYPERGLYCEMIA (HCC): ICD-10-CM

## 2025-01-30 RX ORDER — PROCHLORPERAZINE 25 MG/1
1 SUPPOSITORY RECTAL
Qty: 9 EACH | Refills: 1 | Status: SHIPPED | OUTPATIENT
Start: 2025-01-30

## 2025-02-11 ENCOUNTER — NURSE TRIAGE (OUTPATIENT)
Age: 40
End: 2025-02-11

## 2025-02-11 ENCOUNTER — DOCUMENTATION (OUTPATIENT)
Dept: ENDOCRINOLOGY | Facility: CLINIC | Age: 40
End: 2025-02-11

## 2025-02-11 NOTE — TELEPHONE ENCOUNTER
Patient called stating he has been low blood sugars in the morning and at night. He states he is wearing his Dexcom, which is connected to the office. This morning it was in the 40s, he ate breakfast and it went up, it is now in the 50s again. Patient was driving and did not know the exact number. He paused his insulin pump for now and is going to eat something to help bring it up. Patient asking if the provider can look at his dexcom and make changes to pump settings or metformin as needed.  Please advise

## 2025-02-11 NOTE — TELEPHONE ENCOUNTER
"Reason for Disposition   Morning (before breakfast) blood glucose < 80 mg/dL (4.4 mmol/L) and more than once in past week    Answer Assessment - Initial Assessment Questions  1. SYMPTOMS: \"What symptoms are you concerned about?\"      Feeling okay now     2. ONSET:  \"When did the symptoms start?\"      Felt \"weird\" earlier    3. BLOOD GLUCOSE: \"What is your blood glucose level?\"       40-50    4. USUAL RANGE: \"What is your blood glucose level usually?\" (e.g., usual fasting morning value, usual evening value)      Unknown    5. TYPE 1 or 2:  \"Do you know what type of diabetes you have?\"  (e.g., Type 1, Type 2, Gestational; doesn't know)       Type 1     6. INSULIN: \"Do you take insulin?\" \"What type of insulin(s) do you use? What is the mode of delivery? (syringe, pen; injection or pump) \"When did you last give yourself an insulin dose?\" (i.e., time or hours/minutes ago) \"How much did you give?\" (i.e., how many units)      Humalog via pump    7. DIABETES PILLS: \"Do you take any pills for your diabetes?\" If Yes, ask: \"What is the name of the medicine(s) that you take for high blood sugar?\"      Metformin    8. OTHER SYMPTOMS: \"Do you have any symptoms?\" (e.g., fever, frequent urination, difficulty breathing, vomiting)      Denies    9. LOW BLOOD GLUCOSE TREATMENT: \"What have you done so far to treat the low blood glucose level?\"      Patient is going to eat something now. Also has pump on hold    10. FOOD: \"When did you last eat or drink?\"        This morning    11. ALONE: \"Are you alone right now or is someone with you?\"         Yes    Protocols used: Diabetes - Low Blood Sugar-Adult-OH    "

## 2025-02-12 NOTE — TELEPHONE ENCOUNTER
Patient called in, informed him of provider's reply.  He scheduled follow up for this Friday with Dr Brunner.  He is also asking if he should do anything differently with the metformin?  He said he didn't take it last night because he was afraid he would drop low again.  Please advise and call patient.

## 2025-02-14 ENCOUNTER — OFFICE VISIT (OUTPATIENT)
Dept: ENDOCRINOLOGY | Facility: CLINIC | Age: 40
End: 2025-02-14
Payer: COMMERCIAL

## 2025-02-14 VITALS
OXYGEN SATURATION: 97 % | HEART RATE: 99 BPM | WEIGHT: 193.4 LBS | DIASTOLIC BLOOD PRESSURE: 70 MMHG | SYSTOLIC BLOOD PRESSURE: 110 MMHG | BODY MASS INDEX: 31.08 KG/M2 | HEIGHT: 66 IN

## 2025-02-14 DIAGNOSIS — E78.2 MIXED HYPERLIPIDEMIA: ICD-10-CM

## 2025-02-14 DIAGNOSIS — E10.65 TYPE 1 DIABETES MELLITUS WITH HYPERGLYCEMIA (HCC): Primary | ICD-10-CM

## 2025-02-14 PROCEDURE — 95251 CONT GLUC MNTR ANALYSIS I&R: CPT | Performed by: INTERNAL MEDICINE

## 2025-02-14 PROCEDURE — 99214 OFFICE O/P EST MOD 30 MIN: CPT | Performed by: INTERNAL MEDICINE

## 2025-02-14 RX ORDER — ACYCLOVIR 400 MG/1
1 TABLET ORAL
Qty: 9 EACH | Refills: 1 | Status: SHIPPED | OUTPATIENT
Start: 2025-02-14

## 2025-02-14 RX ORDER — INSULIN PMP CART,AUT,G6/7,CNTR
1 EACH SUBCUTANEOUS
Qty: 30 EACH | Refills: 1 | Status: SHIPPED | OUTPATIENT
Start: 2025-02-14

## 2025-02-14 NOTE — PROGRESS NOTES
Ranjeet Soler  39 y.o.  Male MRN: [id number]  Encounter: [id number]     Established Patient Progress Note      CC: Type 1 diabetes mellitus    ASSESSMENT AND PLAN  Assessment:   Ranjeet Soler is a 39 y.o. male with type 1 diabetes and hyperlipidemia.     Plan:  1. Type 1 diabetes mellitus with hyperglycemia (HCC)  Assessment & Plan:    Lab Results   Component Value Date    HGBA1C 7.6 (H) 09/28/2024     Continue use of OmniPod and will update this to a G6/G7 model since I will be sending in a prescription for Dexcom G7 now   Continue current pump settings  Counseled patient on proper bolusing before meals and avoiding extra carb boluses to improve blood glucose. Additionally advised him to confirm hypoglycemia with fingerstick blood glucose prior to treating and during treatment to avoid overcorrection.  New lab script for A1c and CMP was provided during today's visit and he will have this lab work completed as soon as possible  RTC in 6 months for a follow up visit   Orders:  -     Hemoglobin A1C; Future  -     Comprehensive metabolic panel; Future  -     Continuous Glucose Sensor (Dexcom G7 Sensor); Use 1 Device every 10 days  -     Insulin Disposable Pump (Omnipod 5 [id number] Pods Gen 5) MISC; Use 1 each every 3 (three) days  2. Mixed hyperlipidemia  Assessment & Plan:  Continue atorvastatin 20 mg daily       HISTORY OF PRESENT ILLNESS  HPI:  Ranjeet Soler is a 39 y.o. male with a past medical history of type 1 diabetes diagnosed at age 18, GERD, and hyperlipidemia who presents today for a follow up visit. He was previously seen on 12/24/24 by Brgiid CASTELLON.    OmniPod 5 w/Dexcom G6 and Humalog   Basal rates: MN-6:30a: 1.3 units/hr                      6:30a-5p: 1.6 units/hr                      5p-MN: 1.3 units/hr     ICR: 4  ISF: MN-8a: 40          8a-8p: 35          8p-MN: 36    Target: 120  AIT: 2.5 hours   Pump change: 2.5-3 days    Additionally he is on metformin  mg two tablets twice  daily.    Glycemic Control   A1c: 7.6% (9/28/24)     CGM Data:         Device used: Dexcom G6      Indication: Type 1 diabetes mellitus       More than 72 hours of data was reviewed. Report to be scanned to chart.       Date Range: 1/31/25-2/13/25       Analysis of data:         Average Glucose: 158 mg/dL         Coefficient of Variation: 36.1%            SD: 57 mg/dL          Time in Target Range: 66%          Time Above Range: 22% high, 8% very high         Time Below Range: 2% low, 2% very low       Interpretation of data:      Hypoglycemia: Fasting hypoglycemia and occasional hypoglycemia postprandially if he overcorrects or administers boluses in the middle of his meals. Additionally has been bolusing extra carbs.     Lifestyle    Diet: On-the-go meals     Physical activity: Playing with children    Maintenance    Eye exam: 2024    Foot exam: Podiatry     Statin: Atorvastatin 20 mg daily    ACE-I/ARB: N/A        Review of Systems   Constitutional:  Negative for chills and fever.   HENT:  Negative for ear pain and sore throat.    Eyes:  Negative for pain and visual disturbance.   Respiratory:  Negative for cough and shortness of breath.    Cardiovascular:  Negative for chest pain and palpitations.   Gastrointestinal:  Negative for abdominal pain and vomiting.   Genitourinary:  Negative for dysuria and hematuria.   Musculoskeletal:  Negative for arthralgias and back pain.   Skin:  Negative for color change and rash.   Neurological:  Negative for seizures and syncope.   All other systems reviewed and are negative.      Patient Active Problem List   Diagnosis    Type 1 diabetes mellitus without complication (HCC)    Mixed hyperlipidemia    Obesity (BMI 30-39.9)    Gastroesophageal reflux disease without esophagitis    Type 1 diabetes mellitus with hyperglycemia (HCC)      Past Medical History:   Diagnosis Date    Diabetes type 1, controlled (HCC)     GERD (gastroesophageal reflux disease)       Past Surgical  History:   Procedure Laterality Date    DENTAL SURGERY        Family History   Problem Relation Age of Onset    No Known Problems Mother     Cancer Father     No Known Problems Sister     Glaucoma Maternal Grandmother      Social History     Tobacco Use    Smoking status: Former     Types: Cigarettes     Passive exposure: Past    Smokeless tobacco: Never    Tobacco comments:     He quit at age 15.     Substance Use Topics    Alcohol use: Yes     Alcohol/week: 2.0 standard drinks of alcohol     Types: 1 Cans of beer, 1 Standard drinks or equivalent per week     Comment: occasional     Allergies   Allergen Reactions    Naproxen Shortness Of Breath         Current Outpatient Medications:     acetone, urine, test strip, Use 1 strip if needed for high blood sugar, Disp: 25 each, Rfl: 5    atorvastatin (LIPITOR) 20 mg tablet, TAKE 1 TABLET BY MOUTH EVERY DAY, Disp: 90 tablet, Rfl: 1    Continuous Glucose Sensor (Dexcom G6 Sensor) MISC, USE 1 UNITS EVERY 10 DAYS, Disp: 9 each, Rfl: 1    Continuous Glucose Transmitter (Dexcom G6 Transmitter) MISC, CHANGE EVERY 90 DAYS., Disp: 1 each, Rfl: 1    FREESTYLE LITE test strip, 4 (four) times a day Use as directed, Disp: , Rfl:     Glucagon, rDNA, (Glucagon Emergency) 1 MG KIT, Inject 1 Dose as directed if needed (hypoglycemic emergency), Disp: 1 kit, Rfl: 1    HumaLOG 100 UNIT/ML injection, Use 200 units every 3 days via pump, Disp: 45 mL, Rfl: 1    insulin degludec (Tresiba FlexTouch) 100 units/mL injection pen, Inject 25 Units under the skin daily, Disp: 15 mL, Rfl: 2    Insulin Disposable Pump (Omnipod 5 G6 Intro, Gen 5,) KIT, Use 1 each daily As directed., Disp: 1 kit, Rfl: 0    Insulin Disposable Pump (Omnipod 5 G6 Pods, Gen 5,) MISC, USE 1 EACH EVERY 3 DAYS, Disp: 30 each, Rfl: 1    metFORMIN (GLUCOPHAGE-XR) 500 mg 24 hr tablet, Take 2 tablets (1,000 mg total) by mouth 2 (two) times a day with meals, Disp: 360 tablet, Rfl: 1    omeprazole (PriLOSEC) 20 mg delayed release  "capsule, TAKE 1 CAPSULE BY MOUTH EVERY DAY, Disp: 90 capsule, Rfl: 1    amoxicillin (AMOXIL) 875 mg tablet, TAKE 1 TABLET 2 TIMES A DAY UNTIL COMPLETE (Patient not taking: Reported on 11/15/2024), Disp: , Rfl:     amoxicillin-clavulanate (AUGMENTIN) 875-125 mg per tablet, Take 1 tablet by mouth every 12 (twelve) hours (Patient not taking: Reported on 2/14/2025), Disp: , Rfl:     chlorhexidine (PERIDEX) 0.12 % solution, , Disp: , Rfl:     Continuous Blood Gluc Sensor (FreeStyle Rock 2 Sensor) MISC, CHANGE SENSOR EVERY 14 DAYS AS DIRECTED (Patient not taking: Reported on 5/13/2024), Disp: , Rfl:     Insulin Disposable Pump (Omnipod DASH Pods, Gen 4,) MISC, AS DIRECTED VIA PUMP DAILY 90 (Patient not taking: Reported on 2/14/2025), Disp: , Rfl:     Insulin Lispro (HumaLOG) 100 units/mL cartridge for injection, Use 200 units every 3 days via insulin pump. (Patient not taking: Reported on 2/14/2025), Disp: 60 mL, Rfl: 1      OBJECTIVE  Visit Vitals  /70   Pulse 99   Ht 5' 6\" (1.676 m)   Wt 87.7 kg (193 lb 6.4 oz)   SpO2 97%   BMI 31.22 kg/m²   Smoking Status Former   BSA 1.97 m²       Physical Exam  Constitutional:       General: He is not in acute distress.     Appearance: Normal appearance.   HENT:      Head: Normocephalic and atraumatic.      Mouth/Throat:      Mouth: Mucous membranes are moist.      Pharynx: Oropharynx is clear.   Eyes:      Extraocular Movements: Extraocular movements intact.      Conjunctiva/sclera: Conjunctivae normal.      Pupils: Pupils are equal, round, and reactive to light.   Cardiovascular:      Rate and Rhythm: Normal rate and regular rhythm.   Pulmonary:      Effort: Pulmonary effort is normal.      Breath sounds: Normal breath sounds.   Abdominal:      General: Abdomen is flat. There is no distension.      Palpations: Abdomen is soft.      Tenderness: There is no abdominal tenderness.   Musculoskeletal:         General: No swelling or deformity. Normal range of motion.      Cervical " back: Normal range of motion and neck supple.   Lymphadenopathy:      Cervical: No cervical adenopathy.   Skin:     General: Skin is warm and dry.   Neurological:      General: No focal deficit present.      Mental Status: He is alert and oriented to person, place, and time.   Psychiatric:         Mood and Affect: Mood normal.         Behavior: Behavior normal.       Labs:    Latest Reference Range & Units 09/28/24 09:04   pH 5.0 - 7.5  5.5   Sodium 134 - 144 mmol/L 136   Potassium 3.5 - 5.2 mmol/L 4.6   Chloride 96 - 106 mmol/L 99   Carbon Dioxide 20 - 29 mmol/L 23   BUN 6 - 24 mg/dL 16   Creatinine 0.76 - 1.27 mg/dL 0.82   SL AMB BUN/CREATININE RATIO 9 - 20  20   GLUCOSE 70 - 99 mg/dL 218 (H)   AST 0 - 40 IU/L 13   ALT 0 - 44 IU/L 13   Total Protein 6.0 - 8.5 g/dL 6.8   Albumin 4.1 - 5.1 g/dL 4.2   Total Bilirubin 0.0 - 1.2 mg/dL 0.6   GFR, Calculated >59 mL/min/1.73 114   Cholesterol 100 - 199 mg/dL 133   Triglycerides 0 - 149 mg/dL 74   HDL >39 mg/dL 42   LDL Calculated 0 - 99 mg/dL 76   VLDL Cholesterol Matteo 5 - 40 mg/dL 15   ALKALINE PHOSPHATASE ISOENZYMES 44 - 121 IU/L 77   Globulin, Total 1.5 - 4.5 g/dL 2.6   Lymphocytes (Absolute) 0.7 - 3.1 x10E3/uL 2.0   Hemoglobin A1C 4.8 - 5.6 % 7.6 (H)   CALCIUM 8.7 - 10.2 mg/dL 9.0   White Blood Cell Count 3.4 - 10.8 x10E3/uL 7.2   Red Blood Cell Count 4.14 - 5.80 x10E6/uL 5.03   Hemoglobin 13.0 - 17.7 g/dL 14.4   HCT 37.5 - 51.0 % 43.9   MCV 79 - 97 fL 87   MCH 26.6 - 33.0 pg 28.6   MCHC. 31.5 - 35.7 g/dL 32.8   RDW 11.6 - 15.4 % 12.2   Platelet Count 150 - 450 x10E3/uL 449   Neutrophils Not Estab. % 58   Lymphocytes Not Estab. % 28   Monocytes Not Estab. % 7   Eosinophils Not Estab. % 6   Basophils PCT Not Estab. % 1   Neutrophils (Absolute) 1.4 - 7.0 x10E3/uL 4.1   Monocytes (Absolute) 0.1 - 0.9 x10E3/uL 0.5   Eosinophils (Absolute) 0.0 - 0.4 x10E3/uL 0.4   Basophils ABS 0.0 - 0.2 x10E3/uL 0.1   Immature Granulocytes Not Estab. % 0   Immature Granulocytes (Absolute)  0.0 - 0.1 x10E3/uL 0.0   Color, UA Yellow  Yellow   SL AMB SPECIFIC GRAVITY_URINE 1.005 - 1.030       >=1.030 !   Ketones, UA Negative  2+ !   Blood, UA Negative  Negative   Nitrite, UA Negative  Negative   Leukocytes, UA Negative  Negative   SL AMB POCT UROBILINOGEN 0.2 - 1.0 mg/dL 1.0   EXT Creatinine Urine Not Estab. mg/dL 216.8   Albumin Creat Ratio 0 - 29 mg/g creat 3   Albumin,U,Random Not Estab. ug/mL 6.2   Bilirubin, Urine Negative  Negative   Glucose, 24 HR Urine Negative  2+ !   MICROSCOPIC EXAMINATION  Comment   PROTEIN Negative/Trace  Trace   Urine Appearance Clear  Turbid !   (H): Data is abnormally high  !: Data is abnormal      Discussed with the patient and all questioned fully answered. He will call me if any problems arise.    Counseled patient on diagnostic results, prognosis, risk and benefit of treatment options, instruction for management, importance of treatment compliance, risk factor reduction, and impressions.

## 2025-02-14 NOTE — PATIENT INSTRUCTIONS
Please try to bolus your meals at least 10-15 minutes before you plan to eat and do not enter extra carbs in order to correct a high   If you do have low blood glucose, try to check a fingerstick blood glucose as well while you are treating it  Call the office if you notice your blood sugars are consistently <70 or >250 consistently for more than 2 days at a time

## 2025-02-14 NOTE — ASSESSMENT & PLAN NOTE
Lab Results   Component Value Date    HGBA1C 7.6 (H) 09/28/2024     Continue use of OmniPod and will update this to a G6/G7 model since I will be sending in a prescription for Dexcom G7 now   Continue current pump settings  Counseled patient on proper bolusing before meals and avoiding extra carb boluses to improve blood glucose. Additionally advised him to confirm hypoglycemia with fingerstick blood glucose prior to treating and during treatment to avoid overcorrection.  New lab script for A1c and CMP was provided during today's visit and he will have this lab work completed as soon as possible  RTC in 6 months for a follow up visit

## 2025-03-15 DIAGNOSIS — K21.9 GASTROESOPHAGEAL REFLUX DISEASE WITHOUT ESOPHAGITIS: ICD-10-CM

## 2025-03-16 RX ORDER — OMEPRAZOLE 20 MG/1
20 CAPSULE, DELAYED RELEASE ORAL DAILY
Qty: 90 CAPSULE | Refills: 1 | Status: SHIPPED | OUTPATIENT
Start: 2025-03-16

## 2025-03-23 DIAGNOSIS — E10.65 TYPE 1 DIABETES MELLITUS WITH HYPERGLYCEMIA (HCC): ICD-10-CM

## 2025-03-24 RX ORDER — INSULIN LISPRO 100 [IU]/ML
INJECTION, SOLUTION INTRAVENOUS; SUBCUTANEOUS
Qty: 60 ML | Refills: 1 | Status: SHIPPED | OUTPATIENT
Start: 2025-03-24

## 2025-04-01 ENCOUNTER — APPOINTMENT (OUTPATIENT)
Dept: RADIOLOGY | Facility: CLINIC | Age: 40
End: 2025-04-01
Payer: COMMERCIAL

## 2025-04-01 ENCOUNTER — OFFICE VISIT (OUTPATIENT)
Dept: FAMILY MEDICINE CLINIC | Facility: CLINIC | Age: 40
End: 2025-04-01
Payer: COMMERCIAL

## 2025-04-01 VITALS
TEMPERATURE: 97.2 F | OXYGEN SATURATION: 98 % | SYSTOLIC BLOOD PRESSURE: 122 MMHG | RESPIRATION RATE: 16 BRPM | BODY MASS INDEX: 31.43 KG/M2 | DIASTOLIC BLOOD PRESSURE: 70 MMHG | HEART RATE: 92 BPM | WEIGHT: 195.6 LBS | HEIGHT: 66 IN

## 2025-04-01 DIAGNOSIS — R10.9 RIGHT FLANK PAIN: ICD-10-CM

## 2025-04-01 DIAGNOSIS — R10.9 RIGHT FLANK PAIN: Primary | ICD-10-CM

## 2025-04-01 PROCEDURE — 99213 OFFICE O/P EST LOW 20 MIN: CPT

## 2025-04-01 PROCEDURE — 72070 X-RAY EXAM THORAC SPINE 2VWS: CPT

## 2025-04-01 PROCEDURE — 71046 X-RAY EXAM CHEST 2 VIEWS: CPT

## 2025-04-01 NOTE — PROGRESS NOTES
Name: Ranjeet Soler      : 1985      MRN: [id number]  Encounter Provider: CANDE Ratliff  Encounter Date: 2025   Encounter department: [facility]  :  Assessment & Plan  Right flank pain  Discussed MSK injury resolution timeline of 6-8 weeks. Will check x-rays as ordered for possible rib fx. Could consider RUQ u/s d/t area of pain. Pt advised to continue use of Ibuprofen or Tylenol PRN, ice, heat, lidocaine patch, and topical pain relieving gel. The patient will obtain the imaging ordered today. The patient will be notified of results when available and also any further recommendations. Follow up as needed or if symptoms worsen or fail to improve.   Orders:  •  XR chest pa and lateral; Future  •  XR spine thoracic 2 vw; Future           History of Present Illness {?Quick Links Encounters * My Last Note * Last Note in Specialty * Snapshot * Since Last Visit * History :84432}  Ranjeet Soler is a 39 y.o. year old male who presents today with a concern of right sided pain that occurred 2 Saturdays ago when he got pulled into a railing by his dog. Denies bruising. The pain is radiating to his back and he has pain when he raises his arm. The pain is not getting better. He's taken some advil and tylenol. Helped minimally. No heat or ice. No topical creams.           Review of Systems   Constitutional: Negative.  Negative for chills, fatigue and fever.   HENT: Negative.  Negative for congestion, ear pain, rhinorrhea and sore throat.    Eyes: Negative.  Negative for pain and visual disturbance.   Respiratory: Negative.  Negative for cough and shortness of breath.    Cardiovascular: Negative.  Negative for chest pain, palpitations and leg swelling.   Gastrointestinal:  Negative for abdominal pain, constipation, diarrhea, nausea and vomiting.   Endocrine: Negative.    Genitourinary:  Positive for flank pain. Negative for dysuria, frequency and urgency.   Musculoskeletal:   "Positive for back pain and myalgias. Negative for neck pain and neck stiffness.   Skin: Negative.  Negative for color change, rash and wound.   Allergic/Immunologic: Negative.    Neurological: Negative.  Negative for dizziness, weakness, light-headedness and headaches.   Hematological: Negative.    Psychiatric/Behavioral: Negative.         Objective {?Quick Links Trend Vitals * Enter New Vitals * Results Review * Timeline (Adult) * Labs * Imaging * Cardiology * Procedures * Lung Cancer Screening * Surgical eConsent :24696}  /70 (BP Location: Left arm, Patient Position: Sitting, Cuff Size: Standard)   Pulse 92   Temp (!) 97.2 °F (36.2 °C) (Tympanic)   Resp 16   Ht 5' 6\" (1.676 m)   Wt 88.7 kg (195 lb 9.6 oz)   SpO2 98%   BMI 31.57 kg/m²      Physical Exam  Vitals and nursing note reviewed.   Constitutional:       General: He is not in acute distress.     Appearance: Normal appearance. He is not ill-appearing.   HENT:      Head: Normocephalic and atraumatic.      Right Ear: External ear normal.      Left Ear: External ear normal.      Nose: Nose normal.      Mouth/Throat:      Mouth: Mucous membranes are moist.      Pharynx: Oropharynx is clear.   Eyes:      Extraocular Movements: Extraocular movements intact.      Conjunctiva/sclera: Conjunctivae normal.      Pupils: Pupils are equal, round, and reactive to light.   Cardiovascular:      Rate and Rhythm: Normal rate and regular rhythm.      Heart sounds: Normal heart sounds. No murmur heard.  Pulmonary:      Effort: Pulmonary effort is normal. No respiratory distress.      Breath sounds: Normal breath sounds. No wheezing.   Chest:      Chest wall: Tenderness present. No swelling, crepitus or edema.   Abdominal:      General: Abdomen is flat. Bowel sounds are normal. There is no distension. There are no signs of injury.      Palpations: Abdomen is soft. There is no fluid wave or hepatomegaly.      Tenderness: There is abdominal tenderness in the right " upper quadrant. There is no right CVA tenderness, left CVA tenderness, guarding or rebound. Negative signs include Prasad's sign, Rovsing's sign, McBurney's sign, psoas sign and obturator sign.      Hernia: No hernia is present.       Musculoskeletal:         General: Tenderness present. Normal range of motion.      Cervical back: Normal range of motion.   Skin:     General: Skin is warm and dry.      Capillary Refill: Capillary refill takes less than 2 seconds.      Findings: No bruising, erythema or rash.   Neurological:      General: No focal deficit present.      Mental Status: He is alert and oriented to person, place, and time.   Psychiatric:         Mood and Affect: Mood normal.         Behavior: Behavior normal.         Thought Content: Thought content normal.

## 2025-04-02 ENCOUNTER — RESULTS FOLLOW-UP (OUTPATIENT)
Dept: FAMILY MEDICINE CLINIC | Facility: CLINIC | Age: 40
End: 2025-04-02

## 2025-04-18 ENCOUNTER — TELEPHONE (OUTPATIENT)
Dept: ENDOCRINOLOGY | Facility: CLINIC | Age: 40
End: 2025-04-18

## 2025-04-22 NOTE — TELEPHONE ENCOUNTER
Jorge from [employer] calling to advise they faxed request for insulin pump on 4/17/25 and will fax again today.    No request received yet    Any questions call   Telephone [phone] Jorge

## 2025-04-22 NOTE — TELEPHONE ENCOUNTER
"Patient returning call in regards to message left    States \"I was actually told the pharmacy would reach out to me to discuss out of pocket options before deciding and I haven't heard from them yet\"    Patient states he will call in after he speaks with pharmacy to advise of decision.  "

## 2025-04-22 NOTE — TELEPHONE ENCOUNTER
LVM for patient to confirm switching from Omnipod to [employer]ionics. Forms will not be filled out until patient confirms.

## 2025-05-21 ENCOUNTER — TELEPHONE (OUTPATIENT)
Dept: ENDOCRINOLOGY | Facility: CLINIC | Age: 40
End: 2025-05-21

## 2025-05-21 DIAGNOSIS — E10.65 TYPE 1 DIABETES MELLITUS WITH HYPERGLYCEMIA (HCC): Primary | ICD-10-CM

## 2025-05-22 RX ORDER — INSULIN INFUSION SET/CARTRIDGE
COMBINATION PACKAGE (EA) MISCELLANEOUS
Qty: 225 EACH | Refills: 2 | Status: SHIPPED | OUTPATIENT
Start: 2025-05-22

## 2025-05-27 ENCOUNTER — TELEPHONE (OUTPATIENT)
Dept: DIABETES SERVICES | Facility: CLINIC | Age: 40
End: 2025-05-27

## 2025-05-27 NOTE — TELEPHONE ENCOUNTER
L/M to schedule iLet pump training. If patient calls back, please schedule on either appt...    Ilet Training w/Rep  With Carmen walsh  120 minutes    On June 11th at either 1pm or 2:15 pm.

## 2025-06-05 ENCOUNTER — TELEPHONE (OUTPATIENT)
Age: 40
End: 2025-06-05

## 2025-06-05 DIAGNOSIS — E10.65 TYPE 1 DIABETES MELLITUS WITH HYPERGLYCEMIA (HCC): Primary | ICD-10-CM

## 2025-06-09 ENCOUNTER — TELEPHONE (OUTPATIENT)
Age: 40
End: 2025-06-09

## 2025-06-09 NOTE — TELEPHONE ENCOUNTER
This date was no longer available. The patient has been r/s for 6/17. Sent email to [facility] for confirmation.

## 2025-06-09 NOTE — TELEPHONE ENCOUNTER
See 5/27/25 telephone encounter    Patient calling back in regard to message left for June 11th appointment    Scheduled for 6/11 at 1pm with Carmen as that was the only option    Please advise patient to confirm appointment that the rep will be able to attend

## 2025-06-17 ENCOUNTER — OFFICE VISIT (OUTPATIENT)
Dept: DIABETES SERVICES | Facility: CLINIC | Age: 40
End: 2025-06-17

## 2025-06-17 DIAGNOSIS — E10.65 TYPE 1 DIABETES MELLITUS WITH HYPERGLYCEMIA (HCC): Primary | ICD-10-CM

## 2025-06-17 PROCEDURE — PBNCHG PB NO CHARGE PLACEHOLDER

## 2025-07-06 DIAGNOSIS — E10.65 TYPE 1 DIABETES MELLITUS WITH HYPERGLYCEMIA (HCC): ICD-10-CM

## 2025-07-07 RX ORDER — METFORMIN HYDROCHLORIDE 500 MG/1
1000 TABLET, EXTENDED RELEASE ORAL 2 TIMES DAILY WITH MEALS
Qty: 360 TABLET | Refills: 1 | Status: SHIPPED | OUTPATIENT
Start: 2025-07-07

## 2025-07-11 ENCOUNTER — OFFICE VISIT (OUTPATIENT)
Dept: PODIATRY | Facility: CLINIC | Age: 40
End: 2025-07-11
Payer: COMMERCIAL

## 2025-07-11 DIAGNOSIS — B35.1 ONYCHOMYCOSIS: Primary | ICD-10-CM

## 2025-07-11 DIAGNOSIS — M79.674 PAIN IN TOES OF BOTH FEET: ICD-10-CM

## 2025-07-11 DIAGNOSIS — M79.675 PAIN IN TOES OF BOTH FEET: ICD-10-CM

## 2025-07-11 DIAGNOSIS — E10.65 TYPE 1 DIABETES MELLITUS WITH HYPERGLYCEMIA (HCC): ICD-10-CM

## 2025-07-11 PROCEDURE — 11720 DEBRIDE NAIL 1-5: CPT | Performed by: PODIATRIST

## 2025-07-11 PROCEDURE — 99202 OFFICE O/P NEW SF 15 MIN: CPT | Performed by: PODIATRIST

## 2025-07-11 NOTE — PROGRESS NOTES
Name: Ranjeet Soler      : 1985      MRN: [id number]  Encounter Provider: Jose Beauchamp DPM  Encounter Date: 2025   Encounter department: [facility] [place]  :  Assessment & Plan  Onychomycosis       Debride mycotic nails and thin the nail plates x 1 with the use of a nail nipper manually and an electric Dremel bur was used to reduce the thickness of the nail beds and smoothed the distal aspect of the nails.   Type 1 diabetes mellitus with hyperglycemia (HCC)    Lab Results   Component Value Date    HGBA1C 7.6 (H) 2024            Pain in toes of both feet         Reviewed endocrinology report from 2025.  Reviewed PCPs note from 11/15/2024.  Discussed proper shoe gear, daily inspections of feet, and general foot health with patient. Patient has Q8  findings and is recommended for at risk foot care every 9-10 weeks.    Return in about 1 year (around 2026).     History of Present Illness   HPI  Ranjeet Soler is a 40 y.o. male who presents for a yearly diabetic foot exam.  He is a type I diabetic whose last A1c was 7.6 drawn on 2024.  History obtained from: patient    Review of Systems           Objective   There were no vitals taken for this visit.     Physical Exam    Cardiovascular:      Pulses: Pulses are weak.           Dorsalis pedis pulses are 1+ on the right side and 1+ on the left side.        Posterior tibial pulses are 1+ on the right side and 1+ on the left side.   Feet:      Right foot:      Skin integrity: No ulcer, skin breakdown, erythema, warmth, callus or dry skin.      Left foot:      Skin integrity: No ulcer, skin breakdown, erythema, warmth, callus or dry skin.       Vascular status is 1/4 DP PT negative digital hair, normal distal cooling, immediate capillary refill bilaterally.  Capillary refill is approximately 2 to 3 seconds.  Slight edema is present bilaterally.    Derm the left fifth toenail is elongated brittle hypertrophic white-yellow  discoloration with subungual debris x 1.  There is an increased thickness in the nail of approximately 1 to 2 mm.  The other nails are clear of any pathology.    Ortho mild hammertoe deformities are present on the fifth digits bilaterally which are in adductovarus position.  Mild hammertoe deformities are present on the fourth digits bilaterally.    Neuro light touch is intact and equal bilaterally and 0.5 monofilament test was performed and it was intact and equal bilaterally.  The monofilament test was on the plantar aspect of the hallux, plantar aspect of the 3rd and 4th toes, submet 3, and the plantar aspect of the arch.    Diabetic Foot Exam    Patient's shoes and socks removed.    Right Foot/Ankle   Right Foot Inspection  Skin Exam: Skin not intact, no dry skin, no warmth, no callus, no erythema, no maceration, no abnormal color, no pre-ulcer, no ulcer and no callus.     Toe Exam: right toe deformity. No swelling, no tenderness and erythema    Sensory   Vibration: intact  Monofilament testing: intact    Vascular  Capillary refills: < 3 seconds  The right DP pulse is 1+. The right PT pulse is 1+.     Left Foot/Ankle  Left Foot Inspection  Skin Exam: Skin not intact, no dry skin, no warmth, no erythema, no maceration, normal color, no pre-ulcer, no ulcer and no callus.     Toe Exam: left toe deformity. No swelling, no tenderness and no erythema.     Sensory   Vibration: intact  Monofilament testing: intact    Vascular  Capillary refills: < 3 seconds  The left DP pulse is 1+. The left PT pulse is 1+.     Assign Risk Category  Deformity present  No loss of protective sensation  Weak pulses  Risk: 1

## 2025-07-14 ENCOUNTER — OFFICE VISIT (OUTPATIENT)
Dept: FAMILY MEDICINE CLINIC | Facility: CLINIC | Age: 40
End: 2025-07-14
Payer: COMMERCIAL

## 2025-07-14 VITALS
WEIGHT: 201.4 LBS | SYSTOLIC BLOOD PRESSURE: 112 MMHG | TEMPERATURE: 98.6 F | OXYGEN SATURATION: 96 % | HEART RATE: 111 BPM | DIASTOLIC BLOOD PRESSURE: 72 MMHG | RESPIRATION RATE: 16 BRPM | HEIGHT: 66 IN | BODY MASS INDEX: 32.37 KG/M2

## 2025-07-14 DIAGNOSIS — R06.83 PRIMARY SNORING: ICD-10-CM

## 2025-07-14 DIAGNOSIS — G47.9 SLEEP DISORDER: ICD-10-CM

## 2025-07-14 DIAGNOSIS — K21.9 GASTROESOPHAGEAL REFLUX DISEASE WITHOUT ESOPHAGITIS: ICD-10-CM

## 2025-07-14 DIAGNOSIS — E78.2 MIXED HYPERLIPIDEMIA: ICD-10-CM

## 2025-07-14 DIAGNOSIS — E10.9 TYPE 1 DIABETES MELLITUS WITHOUT COMPLICATION (HCC): Primary | ICD-10-CM

## 2025-07-14 DIAGNOSIS — E66.9 OBESITY (BMI 30-39.9): ICD-10-CM

## 2025-07-14 PROCEDURE — 99214 OFFICE O/P EST MOD 30 MIN: CPT | Performed by: FAMILY MEDICINE

## 2025-07-14 RX ORDER — FAMOTIDINE 40 MG/1
40 TABLET, FILM COATED ORAL 2 TIMES DAILY PRN
COMMUNITY
Start: 2025-03-06 | End: 2025-07-15

## 2025-07-14 RX ORDER — OMEPRAZOLE 40 MG/1
40 CAPSULE, DELAYED RELEASE ORAL DAILY
Qty: 90 CAPSULE | Refills: 0 | Status: SHIPPED | OUTPATIENT
Start: 2025-07-14

## 2025-07-29 DIAGNOSIS — E10.65 TYPE 1 DIABETES MELLITUS WITH HYPERGLYCEMIA (HCC): ICD-10-CM

## 2025-07-31 RX ORDER — INSULIN INFUSION SET/CARTRIDGE
COMBINATION PACKAGE (EA) MISCELLANEOUS
Qty: 225 EACH | Refills: 0 | Status: SHIPPED | OUTPATIENT
Start: 2025-07-31

## 2025-08-04 ENCOUNTER — OFFICE VISIT (OUTPATIENT)
Dept: SLEEP CENTER | Facility: CLINIC | Age: 40
End: 2025-08-04
Payer: COMMERCIAL

## 2025-08-04 VITALS
OXYGEN SATURATION: 96 % | WEIGHT: 202 LBS | HEIGHT: 66 IN | HEART RATE: 103 BPM | BODY MASS INDEX: 32.47 KG/M2 | DIASTOLIC BLOOD PRESSURE: 73 MMHG | SYSTOLIC BLOOD PRESSURE: 119 MMHG

## 2025-08-04 DIAGNOSIS — E66.811 CLASS 1 OBESITY WITH BODY MASS INDEX (BMI) OF 32.0 TO 32.9 IN ADULT, UNSPECIFIED OBESITY TYPE, UNSPECIFIED WHETHER SERIOUS COMORBIDITY PRESENT: ICD-10-CM

## 2025-08-04 DIAGNOSIS — R40.0 DAYTIME SLEEPINESS: Primary | ICD-10-CM

## 2025-08-04 DIAGNOSIS — F51.04 CHRONIC INSOMNIA: ICD-10-CM

## 2025-08-04 DIAGNOSIS — R06.83 SNORING: ICD-10-CM

## 2025-08-04 PROCEDURE — 99203 OFFICE O/P NEW LOW 30 MIN: CPT | Performed by: STUDENT IN AN ORGANIZED HEALTH CARE EDUCATION/TRAINING PROGRAM

## 2025-08-05 ENCOUNTER — TELEPHONE (OUTPATIENT)
Age: 40
End: 2025-08-05

## 2025-08-06 PROBLEM — F32.A DEPRESSION: Status: ACTIVE | Noted: 2025-08-06

## 2025-08-11 ENCOUNTER — TELEPHONE (OUTPATIENT)
Dept: PSYCHIATRY | Facility: CLINIC | Age: 40
End: 2025-08-11

## 2025-08-17 LAB
ALBUMIN SERPL-MCNC: 4.3 G/DL (ref 4.1–5.1)
ALP SERPL-CCNC: 87 IU/L (ref 44–121)
ALT SERPL-CCNC: 10 IU/L (ref 0–44)
AST SERPL-CCNC: 10 IU/L (ref 0–40)
BILIRUB SERPL-MCNC: 0.6 MG/DL (ref 0–1.2)
BUN SERPL-MCNC: 17 MG/DL (ref 6–24)
BUN/CREAT SERPL: 18 (ref 9–20)
CALCIUM SERPL-MCNC: 9.4 MG/DL (ref 8.7–10.2)
CHLORIDE SERPL-SCNC: 100 MMOL/L (ref 96–106)
CO2 SERPL-SCNC: 23 MMOL/L (ref 20–29)
CREAT SERPL-MCNC: 0.92 MG/DL (ref 0.76–1.27)
EGFR: 107 ML/MIN/1.73
GLOBULIN SER-MCNC: 2.4 G/DL (ref 1.5–4.5)
GLUCOSE SERPL-MCNC: 142 MG/DL (ref 70–99)
HBA1C MFR BLD: 6.9 % (ref 4.8–5.6)
POTASSIUM SERPL-SCNC: 4.6 MMOL/L (ref 3.5–5.2)
PROT SERPL-MCNC: 6.7 G/DL (ref 6–8.5)
SODIUM SERPL-SCNC: 138 MMOL/L (ref 134–144)

## 2025-08-20 ENCOUNTER — OFFICE VISIT (OUTPATIENT)
Dept: ENDOCRINOLOGY | Facility: CLINIC | Age: 40
End: 2025-08-20
Payer: COMMERCIAL

## 2025-08-20 VITALS
BODY MASS INDEX: 32.33 KG/M2 | HEART RATE: 104 BPM | HEIGHT: 66 IN | DIASTOLIC BLOOD PRESSURE: 84 MMHG | SYSTOLIC BLOOD PRESSURE: 118 MMHG | WEIGHT: 201.2 LBS

## 2025-08-20 DIAGNOSIS — E78.2 MIXED HYPERLIPIDEMIA: ICD-10-CM

## 2025-08-20 DIAGNOSIS — E10.65 TYPE 1 DIABETES MELLITUS WITH HYPERGLYCEMIA (HCC): Primary | ICD-10-CM

## 2025-08-20 PROCEDURE — 95251 CONT GLUC MNTR ANALYSIS I&R: CPT | Performed by: PHYSICIAN ASSISTANT

## 2025-08-20 PROCEDURE — 99214 OFFICE O/P EST MOD 30 MIN: CPT | Performed by: PHYSICIAN ASSISTANT

## 2025-08-20 RX ORDER — METFORMIN HYDROCHLORIDE 500 MG/1
1000 TABLET, EXTENDED RELEASE ORAL
Qty: 360 TABLET | Refills: 1 | Status: SHIPPED | OUTPATIENT
Start: 2025-08-20

## 2025-08-20 RX ORDER — INSULIN LISPRO 100 [IU]/ML
INJECTION, SOLUTION INTRAVENOUS; SUBCUTANEOUS
Qty: 75 ML | Refills: 2 | Status: SHIPPED | OUTPATIENT
Start: 2025-08-20